# Patient Record
Sex: FEMALE | Race: WHITE | NOT HISPANIC OR LATINO | Employment: UNEMPLOYED | URBAN - METROPOLITAN AREA
[De-identification: names, ages, dates, MRNs, and addresses within clinical notes are randomized per-mention and may not be internally consistent; named-entity substitution may affect disease eponyms.]

---

## 2019-04-22 ENCOUNTER — TELEPHONE (OUTPATIENT)
Dept: OBSTETRICS AND GYNECOLOGY | Age: 29
End: 2019-04-22

## 2019-04-29 ENCOUNTER — INITIAL PRENATAL (OUTPATIENT)
Dept: OBSTETRICS AND GYNECOLOGY | Age: 29
End: 2019-04-29

## 2019-04-29 ENCOUNTER — PROCEDURE VISIT (OUTPATIENT)
Dept: OBSTETRICS AND GYNECOLOGY | Age: 29
End: 2019-04-29

## 2019-04-29 VITALS
WEIGHT: 153 LBS | SYSTOLIC BLOOD PRESSURE: 116 MMHG | BODY MASS INDEX: 25.49 KG/M2 | HEIGHT: 65 IN | DIASTOLIC BLOOD PRESSURE: 74 MMHG

## 2019-04-29 DIAGNOSIS — O36.80X0 ENCOUNTER TO DETERMINE FETAL VIABILITY OF PREGNANCY, SINGLE OR UNSPECIFIED FETUS: Primary | ICD-10-CM

## 2019-04-29 DIAGNOSIS — Z13.89 SCREENING FOR BLOOD OR PROTEIN IN URINE: ICD-10-CM

## 2019-04-29 DIAGNOSIS — Z34.01 ENCOUNTER FOR SUPERVISION OF NORMAL FIRST PREGNANCY IN FIRST TRIMESTER: Primary | ICD-10-CM

## 2019-04-29 DIAGNOSIS — Z12.4 ROUTINE CERVICAL SMEAR: ICD-10-CM

## 2019-04-29 DIAGNOSIS — Z11.3 SCREENING EXAMINATION FOR VENEREAL DISEASE: ICD-10-CM

## 2019-04-29 DIAGNOSIS — Z34.90 PREGNANCY, UNSPECIFIED GESTATIONAL AGE: ICD-10-CM

## 2019-04-29 LAB — VZV IGG SER QL: NORMAL

## 2019-04-29 PROCEDURE — 76817 TRANSVAGINAL US OBSTETRIC: CPT | Performed by: OBSTETRICS & GYNECOLOGY

## 2019-04-29 PROCEDURE — 0501F PRENATAL FLOW SHEET: CPT | Performed by: OBSTETRICS & GYNECOLOGY

## 2019-04-29 RX ORDER — DOCONEXENT, NIACINAMIDE, .ALPHA.-TOCOPHEROL ACETATE, DL-, CHOLECALCIFEROL, .BETA.-CAROTENE, ASCORBIC ACID, THIAMINE MONONITRATE, RIBOFLAVIN, PYRIDOXINE HYDROCHLORIDE, CYANOCOBALAMIN, IRON, ZINC OXIDE, CUPRIC OXIDE, POTASSIUM IODIDE, MAGNESIUM OXIDE, FOLIC ACID, AND LEVOMEFOLATE CALCIUM 200; 15; 20; 1000; 1100; 30; 1.6; 1.8; 2.5; 12; 29; 25; 2; 150; 20; .4; .6 MG/1; MG/1; [IU]/1; [IU]/1; [IU]/1; MG/1; MG/1; MG/1; MG/1; UG/1; MG/1; MG/1; MG/1; UG/1; MG/1; MG/1; MG/1
200 CAPSULE, LIQUID FILLED ORAL DAILY
Qty: 30 CAPSULE | Refills: 11 | Status: SHIPPED | OUTPATIENT
Start: 2019-04-29 | End: 2019-06-03 | Stop reason: SDUPTHER

## 2019-04-29 NOTE — PROGRESS NOTES
Patient is a 28-year-old  1 para 0 here for her first visit in our office.  She is here with her  Hans.  He is in the [employer].  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  They are excited about the pregnancy.  No complaints.  Patient does have a history of anxiety after her mother's death from a drug overdose.  She did therapy but never took any medications.  Otherwise her medical history is uncomplicated.  They do not did not desire genetic testing.    Full history was reviewed.    Exam- see exam tab    Ultrasound shows a 5-day difference in dates.  Patient does have long 35-day cycles.  Due date will be changed to .    Assessment- 6 weeks first pregnancy  New OB folder was reviewed in detail.  New OB labs today.  Patient does have a history of prior chickenpox.  Patient declines any genetic testing.  Continue prenatal vitamins.

## 2019-04-30 ENCOUNTER — TELEPHONE (OUTPATIENT)
Dept: OBSTETRICS AND GYNECOLOGY | Age: 29
End: 2019-04-30

## 2019-04-30 PROBLEM — Z28.39 RUBELLA NON-IMMUNE STATUS, ANTEPARTUM: Status: ACTIVE | Noted: 2019-04-30

## 2019-04-30 PROBLEM — O09.899 RUBELLA NON-IMMUNE STATUS, ANTEPARTUM: Status: ACTIVE | Noted: 2019-04-30

## 2019-04-30 LAB
ABO GROUP BLD: (no result)
BASOPHILS # BLD AUTO: 0 X10E3/UL (ref 0–0.2)
BASOPHILS NFR BLD AUTO: 0 %
BLD GP AB SCN SERPL QL: NEGATIVE
EOSINOPHIL # BLD AUTO: 0.1 X10E3/UL (ref 0–0.4)
EOSINOPHIL NFR BLD AUTO: 2 %
ERYTHROCYTE [DISTWIDTH] IN BLOOD BY AUTOMATED COUNT: 14.8 % (ref 12.3–15.4)
HBV SURFACE AG SERPL QL IA: NEGATIVE
HCT VFR BLD AUTO: 36.9 % (ref 34–46.6)
HCV AB S/CO SERPL IA: <0.1 S/CO RATIO (ref 0–0.9)
HGB BLD-MCNC: 12 G/DL (ref 11.1–15.9)
HIV 1+2 AB+HIV1 P24 AG SERPL QL IA: NON REACTIVE
IMM GRANULOCYTES # BLD AUTO: 0 X10E3/UL (ref 0–0.1)
IMM GRANULOCYTES NFR BLD AUTO: 0 %
LYMPHOCYTES # BLD AUTO: 2 X10E3/UL (ref 0.7–3.1)
LYMPHOCYTES NFR BLD AUTO: 28 %
MCH RBC QN AUTO: 26.8 PG (ref 26.6–33)
MCHC RBC AUTO-ENTMCNC: 32.5 G/DL (ref 31.5–35.7)
MCV RBC AUTO: 83 FL (ref 79–97)
MONOCYTES # BLD AUTO: 0.6 X10E3/UL (ref 0.1–0.9)
MONOCYTES NFR BLD AUTO: 8 %
NEUTROPHILS # BLD AUTO: 4.4 X10E3/UL (ref 1.4–7)
NEUTROPHILS NFR BLD AUTO: 62 %
PLATELET # BLD AUTO: 483 X10E3/UL (ref 150–379)
RBC # BLD AUTO: 4.47 X10E6/UL (ref 3.77–5.28)
RH BLD: POSITIVE
RPR SER QL: NON REACTIVE
RUBV IGG SERPL IA-ACNC: <0.9 INDEX
WBC # BLD AUTO: 7 X10E3/UL (ref 3.4–10.8)

## 2019-04-30 NOTE — TELEPHONE ENCOUNTER
----- Message from Josh Marcelo MD sent at 4/30/2019  9:10 AM EDT -----  Please notify blood work is normal except for rubella level that is nonimmune.  Patient will need measles mumps rubella vaccination postpartum.

## 2019-05-01 LAB
BACTERIA UR CULT: NORMAL
BACTERIA UR CULT: NORMAL
C TRACH RRNA CVX QL NAA+PROBE: NEGATIVE
CONV .: NORMAL
CYTOLOGIST CVX/VAG CYTO: NORMAL
CYTOLOGY CVX/VAG DOC THIN PREP: NORMAL
DX ICD CODE: NORMAL
HIV 1 & 2 AB SER-IMP: NORMAL
N GONORRHOEA RRNA CVX QL NAA+PROBE: NEGATIVE
OTHER STN SPEC: NORMAL
PATH REPORT.FINAL DX SPEC: NORMAL
STAT OF ADQ CVX/VAG CYTO-IMP: NORMAL

## 2019-05-02 ENCOUNTER — TELEPHONE (OUTPATIENT)
Dept: OBSTETRICS AND GYNECOLOGY | Age: 29
End: 2019-05-02

## 2019-05-02 NOTE — TELEPHONE ENCOUNTER
----- Message from Josh Marcelo MD sent at 5/2/2019  7:18 AM EDT -----  Please notify pap is normal.

## 2019-06-03 ENCOUNTER — ROUTINE PRENATAL (OUTPATIENT)
Dept: OBSTETRICS AND GYNECOLOGY | Age: 29
End: 2019-06-03

## 2019-06-03 VITALS — BODY MASS INDEX: 25.96 KG/M2 | DIASTOLIC BLOOD PRESSURE: 72 MMHG | WEIGHT: 156 LBS | SYSTOLIC BLOOD PRESSURE: 124 MMHG

## 2019-06-03 DIAGNOSIS — Z34.01 ENCOUNTER FOR SUPERVISION OF NORMAL FIRST PREGNANCY IN FIRST TRIMESTER: Primary | ICD-10-CM

## 2019-06-03 PROCEDURE — 0502F SUBSEQUENT PRENATAL CARE: CPT | Performed by: OBSTETRICS & GYNECOLOGY

## 2019-06-03 NOTE — PROGRESS NOTES
The patient is feeling well.  No bleeding.  Occasional round ligament pains.  She is here today with her .  They declined any genetic testing or AFP testing.    Doppler heart tones are positive.  Blood pressure is normal.  First trimester labs were reviewed.  Patient is rubella nonimmune.    Assessment-11 weeks  Patient is doing well.  We discussed MMR postpartum and careful handwashing during pregnancy.  Genetic testing declined.  Follow-up in 4 weeks.

## 2019-07-01 ENCOUNTER — ROUTINE PRENATAL (OUTPATIENT)
Dept: OBSTETRICS AND GYNECOLOGY | Age: 29
End: 2019-07-01

## 2019-07-01 VITALS — BODY MASS INDEX: 26.13 KG/M2 | DIASTOLIC BLOOD PRESSURE: 74 MMHG | WEIGHT: 157 LBS | SYSTOLIC BLOOD PRESSURE: 118 MMHG

## 2019-07-01 DIAGNOSIS — Z34.01 ENCOUNTER FOR SUPERVISION OF NORMAL FIRST PREGNANCY IN FIRST TRIMESTER: Primary | ICD-10-CM

## 2019-07-01 PROCEDURE — 0502F SUBSEQUENT PRENATAL CARE: CPT | Performed by: OBSTETRICS & GYNECOLOGY

## 2019-07-01 NOTE — PROGRESS NOTES
Patient complained of some episodes of frequent thirst.  That has resolved.  No vaginal bleeding or abdominal pain.  She declines any genetic testing or AFP testing.  Doppler heart tones are positive.  Blood pressure is normal.    Assessment-15 weeks  Patient is doing well.  Follow-up for anatomy ultrasound.

## 2019-07-25 ENCOUNTER — ROUTINE PRENATAL (OUTPATIENT)
Dept: OBSTETRICS AND GYNECOLOGY | Age: 29
End: 2019-07-25

## 2019-07-25 VITALS — BODY MASS INDEX: 26.63 KG/M2 | SYSTOLIC BLOOD PRESSURE: 100 MMHG | WEIGHT: 160 LBS | DIASTOLIC BLOOD PRESSURE: 68 MMHG

## 2019-07-25 DIAGNOSIS — Z28.39 RUBELLA NON-IMMUNE STATUS, ANTEPARTUM: ICD-10-CM

## 2019-07-25 DIAGNOSIS — Z3A.18 18 WEEKS GESTATION OF PREGNANCY: Primary | ICD-10-CM

## 2019-07-25 DIAGNOSIS — O09.899 RUBELLA NON-IMMUNE STATUS, ANTEPARTUM: ICD-10-CM

## 2019-07-25 PROCEDURE — 0502F SUBSEQUENT PRENATAL CARE: CPT | Performed by: PHYSICIAN ASSISTANT
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
## 2019-08-05 ENCOUNTER — PROCEDURE VISIT (OUTPATIENT)
Dept: OBSTETRICS AND GYNECOLOGY | Age: 29
End: 2019-08-05

## 2019-08-05 ENCOUNTER — ROUTINE PRENATAL (OUTPATIENT)
Dept: OBSTETRICS AND GYNECOLOGY | Age: 29
End: 2019-08-05

## 2019-08-05 VITALS — SYSTOLIC BLOOD PRESSURE: 108 MMHG | DIASTOLIC BLOOD PRESSURE: 74 MMHG | WEIGHT: 165 LBS | BODY MASS INDEX: 27.46 KG/M2

## 2019-08-05 DIAGNOSIS — Z34.90 PREGNANCY, UNSPECIFIED GESTATIONAL AGE: ICD-10-CM

## 2019-08-05 DIAGNOSIS — Z03.75 SUSPECTED CERVICAL SHORTENING NOT FOUND: ICD-10-CM

## 2019-08-05 DIAGNOSIS — O44.00 PLACENTA PREVIA ANTEPARTUM: Primary | ICD-10-CM

## 2019-08-05 DIAGNOSIS — Z34.92 SECOND TRIMESTER FETUS: Primary | ICD-10-CM

## 2019-08-05 PROCEDURE — 76817 TRANSVAGINAL US OBSTETRIC: CPT | Performed by: OBSTETRICS & GYNECOLOGY

## 2019-08-05 PROCEDURE — 0502F SUBSEQUENT PRENATAL CARE: CPT | Performed by: OBSTETRICS & GYNECOLOGY

## 2019-08-05 PROCEDURE — 76805 OB US >/= 14 WKS SNGL FETUS: CPT | Performed by: OBSTETRICS & GYNECOLOGY

## 2019-08-05 NOTE — PROGRESS NOTES
Patient is here today for anatomy ultrasound.  She is feeling well.  No vaginal bleeding or contractions.    Anatomy ultrasound shows posterior placenta with previa.  Cervical length is normal at 4.2 cm.  Anatomy appears normal except heart views are suboptimal.  Baby is breech.  Baby is a girl.  Size is equal to dates.    Weight gain for pregnancy is high.  5 pound weight gain since last visit.  Weight gain chart was reviewed with the patient.  She reports she is eating a lot of mashed potatoes but plans to cut back on carbs.    Assessment-20 weeks  Placenta previa-we discussed placenta previa.  Recheck in 4 weeks on ultrasound  Suboptimal views of the heart-recheck on ultrasound in 4 weeks  Patient declines AFP testing  Discussed weight gain

## 2019-09-03 ENCOUNTER — PROCEDURE VISIT (OUTPATIENT)
Dept: OBSTETRICS AND GYNECOLOGY | Age: 29
End: 2019-09-03

## 2019-09-03 ENCOUNTER — ROUTINE PRENATAL (OUTPATIENT)
Dept: OBSTETRICS AND GYNECOLOGY | Age: 29
End: 2019-09-03

## 2019-09-03 VITALS — WEIGHT: 170 LBS | BODY MASS INDEX: 28.29 KG/M2 | SYSTOLIC BLOOD PRESSURE: 108 MMHG | DIASTOLIC BLOOD PRESSURE: 74 MMHG

## 2019-09-03 DIAGNOSIS — IMO0002 EVALUATE ANATOMY NOT SEEN ON PRIOR SONOGRAM: ICD-10-CM

## 2019-09-03 DIAGNOSIS — O44.00 PLACENTA PREVIA ANTEPARTUM: Primary | ICD-10-CM

## 2019-09-03 DIAGNOSIS — Z34.82 ENCOUNTER FOR SUPERVISION OF OTHER NORMAL PREGNANCY IN SECOND TRIMESTER: ICD-10-CM

## 2019-09-03 PROCEDURE — 0502F SUBSEQUENT PRENATAL CARE: CPT | Performed by: OBSTETRICS & GYNECOLOGY

## 2019-09-03 NOTE — PROGRESS NOTES
Patient is here today for ultrasound to recheck the placenta previa.  She is having some allergy symptoms.  Positive fetal movement her mother-in-law is a pediatrician.  Patient is looking for a pediatrician and would like suggestions.    Ultrasound shows resolution of the placenta previa placenta is now over 4 cm from the cervical loss.  Cervical length is normal at 4.8 cm.  Views of the heart are normal with normal outflow tracts.    Weight gain of 5 pounds since last visit.  Weight gain chart was reviewed.  Weight gain is high.    Assessment-24 weeks  Placenta previa-resolved  We discussed pediatricians and classes at the hospital.  We discussed pain control in labor.  Discussed weight gain and recommend decreasing carbohydrates.  Follow-up in 4 weeks

## 2019-10-09 ENCOUNTER — ROUTINE PRENATAL (OUTPATIENT)
Dept: OBSTETRICS AND GYNECOLOGY | Age: 29
End: 2019-10-09

## 2019-10-09 VITALS — WEIGHT: 178 LBS | BODY MASS INDEX: 29.62 KG/M2 | DIASTOLIC BLOOD PRESSURE: 82 MMHG | SYSTOLIC BLOOD PRESSURE: 128 MMHG

## 2019-10-09 DIAGNOSIS — Z23 FLU VACCINE NEED: ICD-10-CM

## 2019-10-09 DIAGNOSIS — Z3A.29 29 WEEKS GESTATION OF PREGNANCY: ICD-10-CM

## 2019-10-09 DIAGNOSIS — Z13.1 SCREENING FOR DIABETES MELLITUS: Primary | ICD-10-CM

## 2019-10-09 PROCEDURE — 90472 IMMUNIZATION ADMIN EACH ADD: CPT | Performed by: OBSTETRICS & GYNECOLOGY

## 2019-10-09 PROCEDURE — 90471 IMMUNIZATION ADMIN: CPT | Performed by: OBSTETRICS & GYNECOLOGY

## 2019-10-09 PROCEDURE — 0502F SUBSEQUENT PRENATAL CARE: CPT | Performed by: OBSTETRICS & GYNECOLOGY

## 2019-10-09 PROCEDURE — 90715 TDAP VACCINE 7 YRS/> IM: CPT | Performed by: OBSTETRICS & GYNECOLOGY

## 2019-10-09 PROCEDURE — 90674 CCIIV4 VAC NO PRSV 0.5 ML IM: CPT | Performed by: OBSTETRICS & GYNECOLOGY

## 2019-10-09 NOTE — PROGRESS NOTES
The patient has no complaints.  She has been out of town and has been eating more carbohydrates.  She is scheduled for classes.    8 pound weight gain since last visit.  Total weight gain for pregnancy is high at 29 pounds  Blood pressure 128/82 with no protein.  Trace lower extremity edema  Doppler heart tones are positive and fundal height is appropriate.    Assessment-29 weeks  Third trimester labs today.  Blood type is a positive  Flu vaccination and pertussis vaccination today.  We discussed high weight gain.  We discussed foods to avoid and encourage patient to walk daily.  Check fetal weight at 34 weeks.

## 2019-10-10 ENCOUNTER — TELEPHONE (OUTPATIENT)
Dept: OBSTETRICS AND GYNECOLOGY | Age: 29
End: 2019-10-10

## 2019-10-10 DIAGNOSIS — R73.09 ELEVATED GLUCOSE TOLERANCE TEST: Primary | ICD-10-CM

## 2019-10-10 LAB
ERYTHROCYTE [DISTWIDTH] IN BLOOD BY AUTOMATED COUNT: 14.6 % (ref 12.3–15.4)
GLUCOSE 1H P 50 G GLC PO SERPL-MCNC: 145 MG/DL (ref 65–139)
HCT VFR BLD AUTO: 32.2 % (ref 34–46.6)
HGB BLD-MCNC: 10.1 G/DL (ref 11.1–15.9)
MCH RBC QN AUTO: 26.3 PG (ref 26.6–33)
MCHC RBC AUTO-ENTMCNC: 31.4 G/DL (ref 31.5–35.7)
MCV RBC AUTO: 84 FL (ref 79–97)
PLATELET # BLD AUTO: 424 X10E3/UL (ref 150–450)
RBC # BLD AUTO: 3.84 X10E6/UL (ref 3.77–5.28)
WBC # BLD AUTO: 10.9 X10E3/UL (ref 3.4–10.8)

## 2019-10-10 RX ORDER — FERROUS SULFATE 325(65) MG
325 TABLET ORAL DAILY
Qty: 30 TABLET | Refills: 5 | Status: SHIPPED | OUTPATIENT
Start: 2019-10-10 | End: 2020-01-30

## 2019-10-10 NOTE — TELEPHONE ENCOUNTER
----- Message from Josh Marcelo MD sent at 10/10/2019  8:31 AM EDT -----  Please notify that glucose tolerance test is elevated.  Please have patient come in for 3-hour GTT.  Patient is also anemic.  Send in ferrous sulfate 325 mg 1 daily.

## 2019-10-15 ENCOUNTER — TELEPHONE (OUTPATIENT)
Dept: OBSTETRICS AND GYNECOLOGY | Age: 29
End: 2019-10-15

## 2019-10-15 DIAGNOSIS — O24.410 DIET CONTROLLED GESTATIONAL DIABETES MELLITUS (GDM), ANTEPARTUM: Primary | ICD-10-CM

## 2019-10-15 LAB
GLUCOSE 1H P 100 G GLC PO SERPL-MCNC: 210 MG/DL (ref 65–179)
GLUCOSE 2H P 100 G GLC PO SERPL-MCNC: 160 MG/DL (ref 65–154)
GLUCOSE 3H P 100 G GLC PO SERPL-MCNC: 103 MG/DL (ref 65–139)
GLUCOSE P FAST SERPL-MCNC: 93 MG/DL (ref 65–94)

## 2019-10-15 NOTE — TELEPHONE ENCOUNTER
----- Message from Josh Marcelo MD sent at 10/15/2019  7:26 AM EDT -----  Please notify 3 hour is abnormal. Start qid BS check with goal of under 95 fasting and under 120 two hours post meals. Send in glucometer, lancets and test strips. 1 week appointment to review levels. I will order diabetic teaching.

## 2019-10-18 ENCOUNTER — HOSPITAL ENCOUNTER (OUTPATIENT)
Dept: DIABETES SERVICES | Facility: HOSPITAL | Age: 29
Discharge: HOME | End: 2019-10-18
Admitting: OBSTETRICS & GYNECOLOGY

## 2019-10-18 PROCEDURE — G0108 DIAB MANAGE TRN  PER INDIV: HCPCS

## 2019-10-21 ENCOUNTER — ROUTINE PRENATAL (OUTPATIENT)
Dept: OBSTETRICS AND GYNECOLOGY | Age: 29
End: 2019-10-21

## 2019-10-21 VITALS — BODY MASS INDEX: 29.12 KG/M2 | DIASTOLIC BLOOD PRESSURE: 72 MMHG | WEIGHT: 175 LBS | SYSTOLIC BLOOD PRESSURE: 114 MMHG

## 2019-10-21 DIAGNOSIS — O09.899 RUBELLA NON-IMMUNE STATUS, ANTEPARTUM: ICD-10-CM

## 2019-10-21 DIAGNOSIS — Z34.90 PREGNANCY, UNSPECIFIED GESTATIONAL AGE: ICD-10-CM

## 2019-10-21 DIAGNOSIS — Z28.39 RUBELLA NON-IMMUNE STATUS, ANTEPARTUM: ICD-10-CM

## 2019-10-21 DIAGNOSIS — O24.410 DIET CONTROLLED GESTATIONAL DIABETES MELLITUS (GDM), ANTEPARTUM: Primary | ICD-10-CM

## 2019-10-21 PROCEDURE — 0502F SUBSEQUENT PRENATAL CARE: CPT | Performed by: OBSTETRICS & GYNECOLOGY

## 2019-10-21 RX ORDER — BLOOD-GLUCOSE METER
EACH MISCELLANEOUS
Refills: 0 | COMMUNITY
Start: 2019-10-15 | End: 2020-01-30

## 2019-10-21 RX ORDER — LANCETS
EACH MISCELLANEOUS
Refills: 6 | COMMUNITY
Start: 2019-10-15 | End: 2020-01-30

## 2019-10-21 NOTE — PROGRESS NOTES
She was diagnosed with gestational diabetes since her last visit.  Her 1 hour glucose tolerance test was 145.  Her 3-hour had 2 elevated  values the 1 hour and 2-hour.  Patient has seen the dietitian and is been working hard on her diet.  She is also been doing about a 30-minute walk daily.  Her blood sugars fasting range from 80-94.  Post breakfast range from 89-1 28 with 4 elevations.  Post lunch ranges from 92-1 23 with 2 elevations in post dinners range from 10 1-1 28 with one elevation.    Doppler heart tones are positive.  Fundal height is appropriate.  Weight gain for pregnancy is high.  Blood pressure 114/72 with no protein.    Assessment-31 weeks  Gestational diabetes mellitus A1-we discussed elevations in diet and exercise in detail.  I discussed with the patient that she does have 7 elevated values which is about 25% of her values.  She would be a candidate for treatment with insulin.  Patient would like to check her sugars for 1 more week.  I encouraged her to call if they remain elevated.  Weight ultrasound in 2 weeks.

## 2019-10-28 ENCOUNTER — ROUTINE PRENATAL (OUTPATIENT)
Dept: OBSTETRICS AND GYNECOLOGY | Age: 29
End: 2019-10-28

## 2019-10-28 VITALS — SYSTOLIC BLOOD PRESSURE: 122 MMHG | DIASTOLIC BLOOD PRESSURE: 70 MMHG | BODY MASS INDEX: 28.79 KG/M2 | WEIGHT: 173 LBS

## 2019-10-28 DIAGNOSIS — O24.410 DIET CONTROLLED GESTATIONAL DIABETES MELLITUS (GDM), ANTEPARTUM: Primary | ICD-10-CM

## 2019-10-28 PROCEDURE — 0502F SUBSEQUENT PRENATAL CARE: CPT | Performed by: OBSTETRICS & GYNECOLOGY

## 2019-10-28 NOTE — PROGRESS NOTES
The patient has been following a stricter diet.  She brought her blood sugars in today.  Fasting levels are all normal.  Lunch levels and breakfast levels also are normal.  She did have 3 high levels with dinner but she did have a baby shower.    Blood pressure is normal at 122/70  Doppler heart tones are positive.  Fundal height is appropriate.    Assessment-32 weeks  Gestational diabetes mellitus A1- blood sugars are improved and we will continue diet.  Follow-up in 1 week for fetal weight ultrasound.

## 2019-11-07 ENCOUNTER — PROCEDURE VISIT (OUTPATIENT)
Dept: OBSTETRICS AND GYNECOLOGY | Age: 29
End: 2019-11-07

## 2019-11-07 ENCOUNTER — ROUTINE PRENATAL (OUTPATIENT)
Dept: OBSTETRICS AND GYNECOLOGY | Age: 29
End: 2019-11-07

## 2019-11-07 VITALS — WEIGHT: 172 LBS | BODY MASS INDEX: 28.62 KG/M2 | DIASTOLIC BLOOD PRESSURE: 76 MMHG | SYSTOLIC BLOOD PRESSURE: 112 MMHG

## 2019-11-07 DIAGNOSIS — O24.410 DIET CONTROLLED GESTATIONAL DIABETES MELLITUS (GDM), ANTEPARTUM: Primary | ICD-10-CM

## 2019-11-07 DIAGNOSIS — Z36.89 ENCOUNTER FOR ULTRASOUND TO ASSESS INTERVAL GROWTH OF FETUS: ICD-10-CM

## 2019-11-07 PROCEDURE — 76816 OB US FOLLOW-UP PER FETUS: CPT | Performed by: OBSTETRICS & GYNECOLOGY

## 2019-11-07 PROCEDURE — 0502F SUBSEQUENT PRENATAL CARE: CPT | Performed by: OBSTETRICS & GYNECOLOGY

## 2019-11-07 NOTE — PROGRESS NOTES
Patient is trying to follow her diet well.  She has 2 elevated fasting levels one elevated lunch level and 3 elevated dinner levels.  She notes good active fetal movements.  Patient is to looking for pediatrician.    1 pound weight loss since last visit.  Blood pressure is normal at 112/76 with no protein  Fundal height is appropriate and Doppler heart tones are positive.  Ultrasound for fetal weight shows an estimated fetal weight of 5 pounds 9 ounces at the 49th percentile.  Abdominal circumference is at the 69th percentile.  Baby is vertex.  ARABELLA is 11.    Assessment-33 weeks 6 days  Gestational diabetes mellitus A1- reassuring normal fetal weight.  Patient does have some elevated blood sugars but less than 25% of the values.  We discussed diet.  Continue dietary changes and exercise and follow-up weekly.  Recommend kick counts.

## 2019-11-15 ENCOUNTER — ROUTINE PRENATAL (OUTPATIENT)
Dept: OBSTETRICS AND GYNECOLOGY | Age: 29
End: 2019-11-15

## 2019-11-15 VITALS — DIASTOLIC BLOOD PRESSURE: 78 MMHG | BODY MASS INDEX: 29.12 KG/M2 | WEIGHT: 175 LBS | SYSTOLIC BLOOD PRESSURE: 124 MMHG

## 2019-11-15 DIAGNOSIS — Z36.85 ANTENATAL SCREENING FOR STREPTOCOCCUS B: Primary | ICD-10-CM

## 2019-11-15 DIAGNOSIS — O24.410 DIET CONTROLLED GESTATIONAL DIABETES MELLITUS (GDM), ANTEPARTUM: ICD-10-CM

## 2019-11-15 PROCEDURE — 0502F SUBSEQUENT PRENATAL CARE: CPT | Performed by: OBSTETRICS & GYNECOLOGY

## 2019-11-15 NOTE — PROGRESS NOTES
The patient is doing well with checking her blood sugars.  She is only had one fasting 1 breakfast and 1 lunch elevation over the 2 weeks.  She is feeling excellent fetal movement.  No vaginal bleeding or contractions.    Group B strep swab is collected.  Cervix is closed and posterior.  Blood pressure is 124/78 with no protein  Fundal height is appropriate.  Doppler heart tones are positive.  Weight at last visit was at the 49th percentile.    Assessment-35 weeks  Gestational diabetes mellitus A1- good blood sugar control.  Continue checking blood sugars 4 times a day.  Recommend kick counts.  Follow-up in 2 weeks.

## 2019-11-17 LAB — GP B STREP DNA SPEC QL NAA+PROBE: NEGATIVE

## 2019-11-18 ENCOUNTER — TELEPHONE (OUTPATIENT)
Dept: OBSTETRICS AND GYNECOLOGY | Age: 29
End: 2019-11-18

## 2019-11-18 NOTE — TELEPHONE ENCOUNTER
----- Message from Josh Marcelo MD sent at 11/18/2019  8:35 AM EST -----  Notify group B strep testing is negative.

## 2019-11-25 ENCOUNTER — ROUTINE PRENATAL (OUTPATIENT)
Dept: OBSTETRICS AND GYNECOLOGY | Age: 29
End: 2019-11-25

## 2019-11-25 VITALS — WEIGHT: 172 LBS | SYSTOLIC BLOOD PRESSURE: 112 MMHG | BODY MASS INDEX: 28.62 KG/M2 | DIASTOLIC BLOOD PRESSURE: 74 MMHG

## 2019-11-25 DIAGNOSIS — O24.410 DIET CONTROLLED GESTATIONAL DIABETES MELLITUS (GDM), ANTEPARTUM: Primary | ICD-10-CM

## 2019-11-25 PROCEDURE — 0502F SUBSEQUENT PRENATAL CARE: CPT | Performed by: OBSTETRICS & GYNECOLOGY

## 2019-11-25 NOTE — PROGRESS NOTES
The patient has no complaints.  She has not felt any contractions.  Baby is moving well.  She brought in her blood sugars.  She has 2 fasting elevations.  Breakfast values are all under 120.  She has 1 lunch and 2 dinner elevations.  Dinner was elevated when she had pizza.    Doppler heart tones are positive.  Fundal height is appropriate.  Blood pressure 112/74 with no protein  Cervical exam is declined  Group B strep swab is negative.    Assessment-36 weeks  Gestational diabetes mellitus A1- good blood sugar control.  Continue checking 4 times a day.  Recommend kick counts.  We discussed signs and symptoms of labor and when to go to the hospital.  Follow-up weekly.

## 2019-12-05 ENCOUNTER — ROUTINE PRENATAL (OUTPATIENT)
Dept: OBSTETRICS AND GYNECOLOGY | Age: 29
End: 2019-12-05

## 2019-12-05 VITALS — BODY MASS INDEX: 29.29 KG/M2 | DIASTOLIC BLOOD PRESSURE: 67 MMHG | WEIGHT: 176 LBS | SYSTOLIC BLOOD PRESSURE: 108 MMHG

## 2019-12-05 DIAGNOSIS — Z34.03 ENCOUNTER FOR SUPERVISION OF NORMAL FIRST PREGNANCY IN THIRD TRIMESTER: Primary | ICD-10-CM

## 2019-12-05 DIAGNOSIS — O24.410 DIET CONTROLLED GESTATIONAL DIABETES MELLITUS (GDM), ANTEPARTUM: ICD-10-CM

## 2019-12-05 DIAGNOSIS — Z3A.37 37 WEEKS GESTATION OF PREGNANCY: ICD-10-CM

## 2019-12-05 PROCEDURE — 0502F SUBSEQUENT PRENATAL CARE: CPT | Performed by: OBSTETRICS & GYNECOLOGY

## 2019-12-05 NOTE — PROGRESS NOTES
Patient's blood sugars are doing well.  Levels are normal except for 2 elevations.  She reports good fetal movement and no contractions.    Cervix is posterior 1 cm and thick ,firm consistency.  Blood pressure 108/67  Group B strep is negative.    Assessment- 37 weeks 6 days  Gestational diabetes mellitus A1-good blood sugar control.  Continue diet and kick counts.  Reviewed birth plan today.  Follow-up weekly.

## 2019-12-12 ENCOUNTER — ROUTINE PRENATAL (OUTPATIENT)
Dept: OBSTETRICS AND GYNECOLOGY | Age: 29
End: 2019-12-12

## 2019-12-12 VITALS — DIASTOLIC BLOOD PRESSURE: 78 MMHG | SYSTOLIC BLOOD PRESSURE: 108 MMHG | BODY MASS INDEX: 29.29 KG/M2 | WEIGHT: 176 LBS

## 2019-12-12 DIAGNOSIS — Z13.89 SCREENING FOR BLOOD OR PROTEIN IN URINE: ICD-10-CM

## 2019-12-12 DIAGNOSIS — O24.410 DIET CONTROLLED GESTATIONAL DIABETES MELLITUS (GDM), ANTEPARTUM: ICD-10-CM

## 2019-12-12 DIAGNOSIS — Z3A.38 38 WEEKS GESTATION OF PREGNANCY: ICD-10-CM

## 2019-12-12 DIAGNOSIS — Z34.03 ENCOUNTER FOR SUPERVISION OF NORMAL FIRST PREGNANCY IN THIRD TRIMESTER: Primary | ICD-10-CM

## 2019-12-12 LAB
GLUCOSE UR STRIP-MCNC: NEGATIVE MG/DL
PROT UR STRIP-MCNC: NEGATIVE MG/DL

## 2019-12-12 PROCEDURE — 81002 URINALYSIS NONAUTO W/O SCOPE: CPT | Performed by: OBSTETRICS & GYNECOLOGY

## 2019-12-12 PROCEDURE — 0502F SUBSEQUENT PRENATAL CARE: CPT | Performed by: OBSTETRICS & GYNECOLOGY

## 2019-12-12 NOTE — PROGRESS NOTES
The patient feels like the baby may have dropped.  She feels active fetal movement.  Blood sugars are normal except for one elevation with lunch and one elevation with dinner.    Cervix is posterior 1 cm and thick.  Blood pressure 108/78  Group B strep is negative  Fundal height is appropriate at 38 cm.    Assessment-38 weeks 6 days  Gestational diabetes mellitus a 1-good blood sugar control.  Patient will return next week near term and we will add a biophysical profile.  Recommend kick counts.  We did discuss possible induction of labor if no labor past 40 weeks.

## 2019-12-19 ENCOUNTER — HOSPITAL ENCOUNTER (INPATIENT)
Facility: HOSPITAL | Age: 29
LOS: 3 days | Discharge: HOME | End: 2019-12-22
Attending: OBSTETRICS & GYNECOLOGY | Admitting: OBSTETRICS & GYNECOLOGY

## 2019-12-19 ENCOUNTER — HOSPITAL ENCOUNTER (OUTPATIENT)
Dept: LABOR AND DELIVERY | Facility: HOSPITAL | Age: 29
Discharge: HOME | End: 2019-12-19

## 2019-12-19 ENCOUNTER — PROCEDURE VISIT (OUTPATIENT)
Dept: OBSTETRICS AND GYNECOLOGY | Age: 29
End: 2019-12-19

## 2019-12-19 ENCOUNTER — ROUTINE PRENATAL (OUTPATIENT)
Dept: OBSTETRICS AND GYNECOLOGY | Age: 29
End: 2019-12-19

## 2019-12-19 VITALS — DIASTOLIC BLOOD PRESSURE: 74 MMHG | WEIGHT: 176 LBS | SYSTOLIC BLOOD PRESSURE: 112 MMHG | BODY MASS INDEX: 29.29 KG/M2

## 2019-12-19 DIAGNOSIS — O24.410 GDM (GESTATIONAL DIABETES MELLITUS), CLASS A1: Primary | ICD-10-CM

## 2019-12-19 DIAGNOSIS — Z13.89 SCREENING FOR HEMATURIA OR PROTEINURIA: Primary | ICD-10-CM

## 2019-12-19 DIAGNOSIS — O24.410 DIET CONTROLLED GESTATIONAL DIABETES MELLITUS (GDM), ANTEPARTUM: ICD-10-CM

## 2019-12-19 LAB
ABO GROUP BLD: NORMAL
BLD GP AB SCN SERPL QL: NEGATIVE
DEPRECATED RDW RBC AUTO: 49.9 FL (ref 37–54)
ERYTHROCYTE [DISTWIDTH] IN BLOOD BY AUTOMATED COUNT: 15.8 % (ref 12.3–15.4)
GLUCOSE BLD-MCNC: 97 MG/DL (ref 65–99)
GLUCOSE UR STRIP-MCNC: NEGATIVE MG/DL
HCT VFR BLD AUTO: 38.9 % (ref 34–46.6)
HGB BLD-MCNC: 13.5 G/DL (ref 12–15.9)
MCH RBC QN AUTO: 30.4 PG (ref 26.6–33)
MCHC RBC AUTO-ENTMCNC: 34.7 G/DL (ref 31.5–35.7)
MCV RBC AUTO: 87.6 FL (ref 79–97)
PLATELET # BLD AUTO: 330 10*3/MM3 (ref 140–450)
PMV BLD AUTO: 10.7 FL (ref 6–12)
PROT UR STRIP-MCNC: NEGATIVE MG/DL
RBC # BLD AUTO: 4.44 10*6/MM3 (ref 3.77–5.28)
RH BLD: POSITIVE
T&S EXPIRATION DATE: NORMAL
WBC NRBC COR # BLD: 9.59 10*3/MM3 (ref 3.4–10.8)

## 2019-12-19 PROCEDURE — 81002 URINALYSIS NONAUTO W/O SCOPE: CPT | Performed by: OBSTETRICS & GYNECOLOGY

## 2019-12-19 PROCEDURE — 76816 OB US FOLLOW-UP PER FETUS: CPT | Performed by: OBSTETRICS & GYNECOLOGY

## 2019-12-19 PROCEDURE — 82947 ASSAY GLUCOSE BLOOD QUANT: CPT | Performed by: OBSTETRICS & GYNECOLOGY

## 2019-12-19 PROCEDURE — 85027 COMPLETE CBC AUTOMATED: CPT | Performed by: OBSTETRICS & GYNECOLOGY

## 2019-12-19 PROCEDURE — 86850 RBC ANTIBODY SCREEN: CPT | Performed by: OBSTETRICS & GYNECOLOGY

## 2019-12-19 PROCEDURE — 86900 BLOOD TYPING SEROLOGIC ABO: CPT | Performed by: OBSTETRICS & GYNECOLOGY

## 2019-12-19 PROCEDURE — 86901 BLOOD TYPING SEROLOGIC RH(D): CPT | Performed by: OBSTETRICS & GYNECOLOGY

## 2019-12-19 PROCEDURE — 0502F SUBSEQUENT PRENATAL CARE: CPT | Performed by: OBSTETRICS & GYNECOLOGY

## 2019-12-19 RX ORDER — ACETAMINOPHEN 325 MG/1
650 TABLET ORAL EVERY 4 HOURS PRN
Status: DISCONTINUED | OUTPATIENT
Start: 2019-12-19 | End: 2019-12-20 | Stop reason: HOSPADM

## 2019-12-19 RX ORDER — LIDOCAINE HYDROCHLORIDE 10 MG/ML
5 INJECTION, SOLUTION EPIDURAL; INFILTRATION; INTRACAUDAL; PERINEURAL AS NEEDED
Status: DISCONTINUED | OUTPATIENT
Start: 2019-12-19 | End: 2019-12-20 | Stop reason: HOSPADM

## 2019-12-19 RX ORDER — ONDANSETRON 4 MG/1
4 TABLET, FILM COATED ORAL EVERY 6 HOURS PRN
Status: DISCONTINUED | OUTPATIENT
Start: 2019-12-19 | End: 2019-12-20 | Stop reason: HOSPADM

## 2019-12-19 RX ORDER — TERBUTALINE SULFATE 1 MG/ML
0.25 INJECTION, SOLUTION SUBCUTANEOUS AS NEEDED
Status: DISCONTINUED | OUTPATIENT
Start: 2019-12-19 | End: 2019-12-20 | Stop reason: HOSPADM

## 2019-12-19 RX ORDER — MINERAL OIL
OIL (ML) MISCELLANEOUS ONCE
Status: DISCONTINUED | OUTPATIENT
Start: 2019-12-19 | End: 2019-12-20 | Stop reason: HOSPADM

## 2019-12-19 RX ORDER — SODIUM CHLORIDE, SODIUM LACTATE, POTASSIUM CHLORIDE, CALCIUM CHLORIDE 600; 310; 30; 20 MG/100ML; MG/100ML; MG/100ML; MG/100ML
125 INJECTION, SOLUTION INTRAVENOUS CONTINUOUS
Status: DISCONTINUED | OUTPATIENT
Start: 2019-12-19 | End: 2019-12-22 | Stop reason: HOSPADM

## 2019-12-19 RX ORDER — SODIUM CHLORIDE 0.9 % (FLUSH) 0.9 %
10 SYRINGE (ML) INJECTION AS NEEDED
Status: DISCONTINUED | OUTPATIENT
Start: 2019-12-19 | End: 2019-12-20 | Stop reason: HOSPADM

## 2019-12-19 RX ORDER — SODIUM CHLORIDE 0.9 % (FLUSH) 0.9 %
3 SYRINGE (ML) INJECTION EVERY 12 HOURS SCHEDULED
Status: DISCONTINUED | OUTPATIENT
Start: 2019-12-19 | End: 2019-12-20 | Stop reason: HOSPADM

## 2019-12-19 RX ORDER — ONDANSETRON 2 MG/ML
4 INJECTION INTRAMUSCULAR; INTRAVENOUS EVERY 6 HOURS PRN
Status: DISCONTINUED | OUTPATIENT
Start: 2019-12-19 | End: 2019-12-20 | Stop reason: HOSPADM

## 2019-12-19 RX ADMIN — SODIUM CHLORIDE, POTASSIUM CHLORIDE, SODIUM LACTATE AND CALCIUM CHLORIDE 125 ML/HR: 600; 310; 30; 20 INJECTION, SOLUTION INTRAVENOUS at 21:09

## 2019-12-19 RX ADMIN — DINOPROSTONE 10 MG: 10 INSERT VAGINAL at 22:00

## 2019-12-19 NOTE — PROGRESS NOTES
The patient has been feeling some contractions on and off.  She is feeling good fetal movement.  Blood sugars have been normal except for one elevation.    Ultrasound shows an estimated fetal weight of 7 pounds 3 ounces at the 34th percentile.  Abdominal circumference at the 41st percentile.  Head measurements are on the small side but the baby is low in the pelvis.  Biophysical profile is 8 out of 8 and ARABELLA is 17.  Cervix is 1 to 2 cm 50% and mid position.  Blood pressure 112/74.  No protein.  B strep is negative.    Assessment-39 weeks 6 days  Gestational diabetes mellitus A1- we discussed options of continued expectant management or induction of labor.  Patient desires induction.  We discussed cervical ripening and risk and benefits of induction.  Patient will go into night for Cervidil.

## 2019-12-20 ENCOUNTER — ANESTHESIA (OUTPATIENT)
Dept: LABOR AND DELIVERY | Facility: HOSPITAL | Age: 29
End: 2019-12-20

## 2019-12-20 ENCOUNTER — ANESTHESIA EVENT (OUTPATIENT)
Dept: LABOR AND DELIVERY | Facility: HOSPITAL | Age: 29
End: 2019-12-20

## 2019-12-20 PROCEDURE — S0260 H&P FOR SURGERY: HCPCS | Performed by: OBSTETRICS & GYNECOLOGY

## 2019-12-20 PROCEDURE — 3E033VJ INTRODUCTION OF OTHER HORMONE INTO PERIPHERAL VEIN, PERCUTANEOUS APPROACH: ICD-10-PCS | Performed by: OBSTETRICS & GYNECOLOGY

## 2019-12-20 PROCEDURE — 99024 POSTOP FOLLOW-UP VISIT: CPT | Performed by: OBSTETRICS & GYNECOLOGY

## 2019-12-20 PROCEDURE — 0KQM0ZZ REPAIR PERINEUM MUSCLE, OPEN APPROACH: ICD-10-PCS | Performed by: OBSTETRICS & GYNECOLOGY

## 2019-12-20 PROCEDURE — C1755 CATHETER, INTRASPINAL: HCPCS | Performed by: ANESTHESIOLOGY

## 2019-12-20 PROCEDURE — C1755 CATHETER, INTRASPINAL: HCPCS

## 2019-12-20 PROCEDURE — 25010000002 BUTORPHANOL PER 1 MG: Performed by: OBSTETRICS & GYNECOLOGY

## 2019-12-20 PROCEDURE — 59400 OBSTETRICAL CARE: CPT | Performed by: OBSTETRICS & GYNECOLOGY

## 2019-12-20 RX ORDER — ONDANSETRON 4 MG/1
4 TABLET, FILM COATED ORAL EVERY 6 HOURS PRN
Status: DISCONTINUED | OUTPATIENT
Start: 2019-12-20 | End: 2019-12-20 | Stop reason: HOSPADM

## 2019-12-20 RX ORDER — OXYTOCIN-SODIUM CHLORIDE 0.9% IV SOLN 30 UNIT/500ML 30-0.9/5 UT/ML-%
125 SOLUTION INTRAVENOUS CONTINUOUS PRN
Status: DISCONTINUED | OUTPATIENT
Start: 2019-12-20 | End: 2019-12-20 | Stop reason: HOSPADM

## 2019-12-20 RX ORDER — ACETAMINOPHEN 325 MG/1
650 TABLET ORAL EVERY 4 HOURS PRN
Status: DISCONTINUED | OUTPATIENT
Start: 2019-12-20 | End: 2019-12-20 | Stop reason: HOSPADM

## 2019-12-20 RX ORDER — BISACODYL 10 MG
10 SUPPOSITORY, RECTAL RECTAL DAILY PRN
Status: DISCONTINUED | OUTPATIENT
Start: 2019-12-21 | End: 2019-12-22 | Stop reason: HOSPADM

## 2019-12-20 RX ORDER — CARBOPROST TROMETHAMINE 250 UG/ML
250 INJECTION, SOLUTION INTRAMUSCULAR AS NEEDED
Status: DISCONTINUED | OUTPATIENT
Start: 2019-12-20 | End: 2019-12-20 | Stop reason: HOSPADM

## 2019-12-20 RX ORDER — IBUPROFEN 600 MG/1
600 TABLET ORAL EVERY 6 HOURS PRN
Status: DISCONTINUED | OUTPATIENT
Start: 2019-12-20 | End: 2019-12-22 | Stop reason: HOSPADM

## 2019-12-20 RX ORDER — LANOLIN
CREAM (ML) TOPICAL AS NEEDED
Status: DISCONTINUED | OUTPATIENT
Start: 2019-12-20 | End: 2019-12-22 | Stop reason: HOSPADM

## 2019-12-20 RX ORDER — EPHEDRINE SULFATE 50 MG/ML
5 INJECTION, SOLUTION INTRAVENOUS AS NEEDED
Status: DISCONTINUED | OUTPATIENT
Start: 2019-12-20 | End: 2019-12-20 | Stop reason: HOSPADM

## 2019-12-20 RX ORDER — ONDANSETRON 2 MG/ML
4 INJECTION INTRAMUSCULAR; INTRAVENOUS ONCE AS NEEDED
Status: DISCONTINUED | OUTPATIENT
Start: 2019-12-20 | End: 2019-12-20 | Stop reason: HOSPADM

## 2019-12-20 RX ORDER — BUTORPHANOL TARTRATE 1 MG/ML
1 INJECTION, SOLUTION INTRAMUSCULAR; INTRAVENOUS
Status: DISCONTINUED | OUTPATIENT
Start: 2019-12-20 | End: 2019-12-22 | Stop reason: HOSPADM

## 2019-12-20 RX ORDER — ECHINACEA PURPUREA EXTRACT 125 MG
1 TABLET ORAL AS NEEDED
Status: DISCONTINUED | OUTPATIENT
Start: 2019-12-20 | End: 2019-12-22 | Stop reason: HOSPADM

## 2019-12-20 RX ORDER — DOCUSATE SODIUM 100 MG/1
100 CAPSULE, LIQUID FILLED ORAL 2 TIMES DAILY PRN
Status: DISCONTINUED | OUTPATIENT
Start: 2019-12-20 | End: 2019-12-22 | Stop reason: HOSPADM

## 2019-12-20 RX ORDER — DIPHENHYDRAMINE HYDROCHLORIDE 50 MG/ML
12.5 INJECTION INTRAMUSCULAR; INTRAVENOUS EVERY 8 HOURS PRN
Status: DISCONTINUED | OUTPATIENT
Start: 2019-12-20 | End: 2019-12-20 | Stop reason: HOSPADM

## 2019-12-20 RX ORDER — ONDANSETRON 2 MG/ML
4 INJECTION INTRAMUSCULAR; INTRAVENOUS EVERY 6 HOURS PRN
Status: DISCONTINUED | OUTPATIENT
Start: 2019-12-20 | End: 2019-12-20 | Stop reason: HOSPADM

## 2019-12-20 RX ORDER — ONDANSETRON 4 MG/1
4 TABLET, FILM COATED ORAL EVERY 8 HOURS PRN
Status: DISCONTINUED | OUTPATIENT
Start: 2019-12-20 | End: 2019-12-22 | Stop reason: HOSPADM

## 2019-12-20 RX ORDER — OXYCODONE HYDROCHLORIDE AND ACETAMINOPHEN 5; 325 MG/1; MG/1
1 TABLET ORAL EVERY 4 HOURS PRN
Status: DISCONTINUED | OUTPATIENT
Start: 2019-12-20 | End: 2019-12-22 | Stop reason: HOSPADM

## 2019-12-20 RX ORDER — METHYLERGONOVINE MALEATE 0.2 MG/ML
200 INJECTION INTRAVENOUS ONCE AS NEEDED
Status: DISCONTINUED | OUTPATIENT
Start: 2019-12-20 | End: 2019-12-20 | Stop reason: HOSPADM

## 2019-12-20 RX ORDER — OXYTOCIN-SODIUM CHLORIDE 0.9% IV SOLN 30 UNIT/500ML 30-0.9/5 UT/ML-%
999 SOLUTION INTRAVENOUS ONCE
Status: DISCONTINUED | OUTPATIENT
Start: 2019-12-20 | End: 2019-12-20 | Stop reason: HOSPADM

## 2019-12-20 RX ORDER — OXYCODONE HYDROCHLORIDE AND ACETAMINOPHEN 5; 325 MG/1; MG/1
2 TABLET ORAL EVERY 4 HOURS PRN
Status: DISCONTINUED | OUTPATIENT
Start: 2019-12-20 | End: 2019-12-22 | Stop reason: HOSPADM

## 2019-12-20 RX ORDER — FAMOTIDINE 10 MG/ML
20 INJECTION, SOLUTION INTRAVENOUS ONCE AS NEEDED
Status: DISCONTINUED | OUTPATIENT
Start: 2019-12-20 | End: 2019-12-20 | Stop reason: HOSPADM

## 2019-12-20 RX ORDER — PROMETHAZINE HYDROCHLORIDE 12.5 MG/1
12.5 TABLET ORAL EVERY 4 HOURS PRN
Status: DISCONTINUED | OUTPATIENT
Start: 2019-12-20 | End: 2019-12-22 | Stop reason: HOSPADM

## 2019-12-20 RX ORDER — OXYCODONE HYDROCHLORIDE AND ACETAMINOPHEN 5; 325 MG/1; MG/1
2 TABLET ORAL EVERY 4 HOURS PRN
Status: DISCONTINUED | OUTPATIENT
Start: 2019-12-20 | End: 2019-12-20 | Stop reason: HOSPADM

## 2019-12-20 RX ORDER — OXYTOCIN-SODIUM CHLORIDE 0.9% IV SOLN 30 UNIT/500ML 30-0.9/5 UT/ML-%
2-20 SOLUTION INTRAVENOUS
Status: DISCONTINUED | OUTPATIENT
Start: 2019-12-20 | End: 2019-12-22 | Stop reason: HOSPADM

## 2019-12-20 RX ORDER — OXYTOCIN-SODIUM CHLORIDE 0.9% IV SOLN 30 UNIT/500ML 30-0.9/5 UT/ML-%
250 SOLUTION INTRAVENOUS CONTINUOUS
Status: ACTIVE | OUTPATIENT
Start: 2019-12-20 | End: 2019-12-20

## 2019-12-20 RX ORDER — MISOPROSTOL 200 UG/1
800 TABLET ORAL AS NEEDED
Status: DISCONTINUED | OUTPATIENT
Start: 2019-12-20 | End: 2019-12-20 | Stop reason: HOSPADM

## 2019-12-20 RX ADMIN — OXYCODONE AND ACETAMINOPHEN 1 TABLET: 5; 325 TABLET ORAL at 20:57

## 2019-12-20 RX ADMIN — SODIUM CHLORIDE, POTASSIUM CHLORIDE, SODIUM LACTATE AND CALCIUM CHLORIDE 1000 ML: 600; 310; 30; 20 INJECTION, SOLUTION INTRAVENOUS at 09:15

## 2019-12-20 RX ADMIN — Medication 10 ML/HR: at 09:34

## 2019-12-20 RX ADMIN — BUTORPHANOL TARTRATE 1 MG: 1 INJECTION, SOLUTION INTRAMUSCULAR; INTRAVENOUS at 03:21

## 2019-12-20 RX ADMIN — Medication 1 APPLICATION: at 23:51

## 2019-12-20 RX ADMIN — IBUPROFEN 600 MG: 600 TABLET, FILM COATED ORAL at 20:57

## 2019-12-20 RX ADMIN — EPHEDRINE SULFATE 5 MG: 50 INJECTION INTRAVENOUS at 12:07

## 2019-12-20 RX ADMIN — SODIUM CHLORIDE, POTASSIUM CHLORIDE, SODIUM LACTATE AND CALCIUM CHLORIDE 999 ML/HR: 600; 310; 30; 20 INJECTION, SOLUTION INTRAVENOUS at 12:07

## 2019-12-20 RX ADMIN — OXYTOCIN 1 MILLI-UNITS/MIN: 10 INJECTION, SOLUTION INTRAMUSCULAR; INTRAVENOUS at 06:34

## 2019-12-20 RX ADMIN — SODIUM CHLORIDE, POTASSIUM CHLORIDE, SODIUM LACTATE AND CALCIUM CHLORIDE 125 ML/HR: 600; 310; 30; 20 INJECTION, SOLUTION INTRAVENOUS at 02:09

## 2019-12-20 RX ADMIN — DOCUSATE SODIUM 100 MG: 100 CAPSULE, LIQUID FILLED ORAL at 20:57

## 2019-12-20 RX ADMIN — Medication 1 APPLICATION: at 20:57

## 2019-12-20 NOTE — ANESTHESIA PROCEDURE NOTES
Labor Epidural      Patient reassessed immediately prior to procedure    Patient location during procedure: OB  Indication:at surgeon's request  Performed By  Anesthesiologist: Leora Merchant MD  Preanesthetic Checklist  Completed: patient identified, site marked, surgical consent, pre-op evaluation, timeout performed, IV checked, risks and benefits discussed and monitors and equipment checked  Prep:  Pt Position:sitting  Sterile Tech:cap, gloves, mask and sterile barrier  Prep:chlorhexidine gluconate and isopropyl alcohol  Monitoring:blood pressure monitoring, continuous pulse oximetry and EKG  Epidural Block Procedure:  Approach:midline  Guidance:landmark technique  Location:L4-L5  Needle Type:Tuohy  Needle Gauge:17  Loss of Resistance Medium: saline  Loss of Resistance: 6cm  Cath Depth at skin:10 cm  Paresthesia: none  Aspiration:negative  Test Dose:negative  Number of Attempts: 1  Post Assessment:  Dressing:occlusive dressing applied and secured with tape  Pt Tolerance:patient tolerated the procedure well with no apparent complications  Complications:no

## 2019-12-20 NOTE — NURSING NOTE
Dr. Hurt called back, Md notified about patients bleeding at this time. Patient had been previously assessed by dr. Montoya this am. Patient had some clots this morning and just now when patient went to the bathroom she had some more bleeding, this time she had a 4cm clot that seemed more residual bleeding that may have been vaginal pooling form earlier. Dr. Montoya had previously not wanted anyone to check her cervix. Patients abdomen palpates soft at this time. Patient is complaining of pain but contraction pain. Asked MD to please come assess her bleeding, md states that she is on her way in and will assess her bleeding when she gets here and talk with her patient. Will continue to monitor.

## 2019-12-20 NOTE — L&D DELIVERY NOTE
[facility]  Vaginal Delivery Note    Delivery     Diagnosis:       Intrauterine Pregnancy 40 0/7 weeks      Diet controlled gestational diabetes  Delivery: Vaginal, Spontaneous     YOB: 2017    Time of Birth: 5:11 PM      Anesthesia: Epidural     Delivering clinician: Lili Hurt    Forceps?   No   Vacuum? No    Shoulder dystocia present: No        Delivery narrative:  The patient was admitted for induction of labor at term with gestational diabetes well controlled by diet.  She underwent cervadil ripening.  cervadil came out a little early and she was started on pitocin.. Her GBS status was negative. The FHTs were reassuring throughout the labor and delivery course. Membranes ruptured spontaneously and was clear fluid.  She received epidural for anesthesia.  She progressed along a normal labor curve to completely dilated.  The patient pushed for 4 contractions for a spontaneous vaginal delivery. The infant was bulb suctioned on the perineum and again after delivery.  The infant had good tone and cry.  The infant was placed on mothers abdomen. Gases were not sent. Cord blood was obtained. The placenta did follow spontaneously. The uterus was explored. A second degree laceration was repaired in the usual fashion.  The patient tolerated the procedure well.  Mother and infant are recovering well at this time    Infant    Findings:  female infant    Infant observations: Weight:   Length:   Observations/Comments:  scale 2      Apgars: 9   @ 1 minute /    9   @ 5 minutes   Infant Name: Lidia Phelps     Placenta, Cord, and Fluid    Placenta delivered  Spontaneous  at  12/20/2019  5:13 PM     Cord: 3 vessels  present.   Nuchal Cord?  no   Cord blood obtained: Yes    Cord gases obtained:  No    Cord gas results: N/A         Repair    Episiotomy: No   Lacerations: Yes  Laceration Information  Laceration Repaired?   Perineal:   2   yes   Periurethral:         Labial:         Sulcus:         Vaginal:          Cervical:           Suture used for repair: 3-0 Vicryl   Estimated Blood Loss:   400 mls.         Complications  none    Disposition  Mother to Mother Baby/Postpartum  in stable condition currently.  Baby to NBN  in stable condition currently.      Lili Hurt MD  12/20/19  5:36 PM

## 2019-12-20 NOTE — PLAN OF CARE
Problem: Patient Care Overview  Goal: Plan of Care Review  Outcome: Ongoing (interventions implemented as appropriate)  Flowsheets (Taken 12/20/2019 0549)  Progress: improving  Plan of Care Reviewed With: patient; spouse  Outcome Summary: pt had cervidil placed last night at 2200, cervidil came out on its own at 0432 when pt got up to the bathroom. Pt had some bleeding noted when up to the bathroom and Dr. Montoya was notified, he came to bedside to assess pt and bleeding. Pt is currently resting comfortably. Plans to start Pitocin.  Goal: Individualization and Mutuality  Outcome: Ongoing (interventions implemented as appropriate)  Flowsheets  Taken 12/20/2019 0549  Patient Specific Goals (Include Timeframe): VICKI after delivery  Patient Specific Interventions: FOB to cut cord  Patient Specific Preferences: vaginal delivery, VICKI, undecided on epidural but leaning towards it, breastfeeding  Taken 12/19/2019 2131  How to Address Anxieties/Fears: educate about POC  How Would You and/or Your Support Person Like to Participate in Your Care?: paricipate in POC  What Anxieties, Fears, Concerns, or Questions Do You Have About Your Care?: labor and delivery in general   Goal: Discharge Needs Assessment  Outcome: Ongoing (interventions implemented as appropriate)  Flowsheets  Taken 12/20/2019 0549  Equipment Needed After Discharge: none  Anticipated Changes Related to Illness: none  Transportation Concerns: car, none  Concerns to be Addressed: no discharge needs identified  Readmission Within the Last 30 Days: no previous admission in last 30 days  Taken 12/19/2019 2130  Equipment Currently Used at Home: none  Taken 12/19/2019 2128  Transportation Anticipated: car, drives self;family or friend will provide  Patient/Family Anticipated Services at Transition: none  Patient/Family Anticipates Transition to: home;home with family  Goal: Interprofessional Rounds/Family Conf  Outcome: Ongoing (interventions implemented as  appropriate)  Flowsheets (Taken 12/20/2019 0549)  Participants: patient; nursing; physician     Problem: Labor (Cervical Ripen, Induct, Augment) (Adult,Obstetrics,Pediatric)  Goal: Signs and Symptoms of Listed Potential Problems Will be Absent, Minimized or Managed (Labor)  Outcome: Ongoing (interventions implemented as appropriate)  Flowsheets (Taken 12/20/2019 1521)  Problems Assessed (Labor): all  Problems Present (Labor): none

## 2019-12-20 NOTE — PROGRESS NOTES
"[facility]  Obstetric Progress Note    Chief Complaint   Patient presents with   • Scheduled Induction     +FM, denies leakage of fluids and vaginal bleeding       Subjective     Patient:    The patient feels well. Just got epidural.  Bleeding per nursing is minimal.  SROM clear.        Review of Systems - Negative except sleepy       Objective     Vital Signs Range for the last 24 hours  Temp:  [97.7 °F (36.5 °C)-98.2 °F (36.8 °C)] 98.1 °F (36.7 °C)   Temp src: Oral   BP: ()/(55-89) 104/60   Heart Rate:  [58-90] 88   Resp:  [16-18] 18               Weight:  [81.4 kg (179 lb 6.4 oz)] 81.4 kg (179 lb 6.4 oz)       Flowsheet Rows      First Filed Value   Admission Height  165.1 cm (65\") Documented at 12/19/2019 2124   Admission Weight  81.4 kg (179 lb 6.4 oz) Documented at 12/19/2019 2124          Intake/Output last 24 hours:      Intake/Output Summary (Last 24 hours) at 12/20/2019 1057  Last data filed at 12/20/2019 0636  Gross per 24 hour   Intake 1306 ml   Output 1300 ml   Net 6 ml       Intake/Output this shift:    No intake/output data recorded.    Physical Exam:  General: Patient is comfortable, well appearing and in no acute distress   Heart CVS exam: normal rate and regular rhythm.   Lungs Chest: no tachypnea, retractions or cyanosis.     Abdomen Abdominal exam: palpable contractions, EFW 7 1/2 lbs.   Extremities Exam of extremities: peripheral pulses normal, no pedal edema, no clubbing or cyanosis     Presentation: vtx   Cervix: Exam by: Method: sterile exam per physician   Dilation: Cervical Dilation (cm): 4   Effacement: Cervical Effacement: 100%   Station: Fetal Station: -2         Fetal Heart Rate Assessment   Method: Fetal HR Assessment Method: external   Beats/min: Fetal HR (beats/min): 130   Baseline: Fetal Heart Baseline Rate: normal range   Varibility: Fetal HR Variability: moderate (amplitude range 6 to 25 bpm)   Accels: Fetal HR Accelerations: greater than/equal to 15 bpm, lasting at least " 15 seconds   Decels: Fetal HR Decelerations: absent   Tracing Category:       Uterine Assessment   Method: Method: external tocotransducer   Frequency (min): Contraction Frequency (Minutes): 2-4   Ctx Count in 10 min:     Duration:     Intensity: Contraction Intensity: moderate by palpation   Intensity by IUPC:     Resting Tone: Uterine Resting Tone: soft by palpation   Resting Tone by IUPC:     Soper Units:       Lab Results (last 24 hours)     Procedure Component Value Units Date/Time    Glucose, Random [[id number]]  (Normal) Collected:  19    Specimen:  Blood from Arm, Left Updated:  19     Glucose 97 mg/dL     CBC (No Diff) [[id number]]  (Abnormal) Collected:  19    Specimen:  Blood from Arm, Left Updated:  19     WBC 9.59 10*3/mm3      RBC 4.44 10*6/mm3      Hemoglobin 13.5 g/dL      Hematocrit 38.9 %      MCV 87.6 fL      MCH 30.4 pg      MCHC 34.7 g/dL      RDW 15.8 %      RDW-SD 49.9 fl      MPV 10.7 fL      Platelets 330 10*3/mm3               Assessment/Plan       Pregnancy    Rubella non-immune status, antepartum    Diet controlled gestational diabetes mellitus (GDM), antepartum        Assessment:  1.  Intrauterine pregnancy at 40w0d weeks gestation with reactive fetal status.    2.  labor  with ROM  3.  Obstetrical history significant for some bleeding early labor, now appears resolved.  Gestational diabetes well controlled on diet.   4.  GBS status: neg    Plan:  1. labor augmentation  Pitocin  2. Plan of care has been reviewed with patient and family  3.  Risks, benefits of treatment plan have been discussed.  4.  All questions have been answered.  5.  Anticipate       Lili Hurt MD 2019 10:57 AM

## 2019-12-20 NOTE — NURSING NOTE
Dr. Hurt called about patient, md notified that patient is complete and zero station, and fetal head is in OP position. MD states ok to labor down at this time and she is main OR if we need her and she will call when she is out of the main OR. Will continue to monitor.

## 2019-12-20 NOTE — NURSING NOTE
Dr. Hurt in the room assessing patient, md told patient felt gush of amniotic fluid, clear fluid noted with scant bleeding with it. MD checked patient to see cervical change. Md notes minimal bleeding on cervical exam. No new orders, will continue to monitor.

## 2019-12-20 NOTE — H&P
History and physical    Admission date 2019     Patient: Deepti Davies MRN: [id number]   YOB: 1990 Age: 29 y.o. Sex: female     Chief Complaint   Patient presents with   • Scheduled Induction     +FM, denies leakage of fluids and vaginal bleeding       HPI:    Deepti Davies is a 29 y.o.,  AT 40w0d admitted for cervical ripening followed by Pitocin induction.  Patient was seen in the office yesterday at which time an ultrasound was performed and she was found to be in the 34th percentile.  BPP was 8 out of 8.  Cervix was 1 to 2 cm 50% mid position.  Patient is GBS negative.  Pregnancy has been complicated by placenta previa early in the pregnancy, upon reviewing old notes it appears that it was documented to have resolved around 26 weeks gestation  She also has gestational diabetes, A1, managed quite well with diet.  Admission blood sugar was 97. Admits to good fetal movement.   Patient had Cervidil placed and a short time ago came out on its own and she was found to have a small amount of vaginal bleeding at that time.  Upon my arrival there is no more bleeding, and fetal heart tones appear reactive.  Patient had an ultrasound yesterday as outlined above with a BPP of 8 out of 8.  She is having some very mild contractions at this time and no further spotting or bleeding with no evidence of leakage of fluid    Patient Active Problem List   Diagnosis   • Pregnancy   • Rubella non-immune status, antepartum   • Diet controlled gestational diabetes mellitus (GDM), antepartum     OB History    Para Term  AB Living   1             SAB TAB Ectopic Molar Multiple Live Births                    # Outcome Date GA Lbr Rik/2nd Weight Sex Delivery Anes PTL Lv   1 Current              Past Medical History:   Diagnosis Date   • Anxiety     after mother's death - overdose      Past Surgical History:   Procedure Laterality Date   • WISDOM TOOTH EXTRACTION       No current facility-administered  medications on file prior to encounter.      Current Outpatient Medications on File Prior to Encounter   Medication Sig Dispense Refill   • Blood Glucose Monitoring Suppl (ONE TOUCH ULTRA 2) w/Device kit TEST BS QID  0   • ferrous sulfate 325 (65 FE) MG tablet Take 1 tablet by mouth Daily. 30 tablet 5   • Lancets (ONETOUCH ULTRASOFT) lancets TEST BLOOD SUGAR QID  6   • ONE TOUCH ULTRA TEST test strip TEST BLOOD SUGAR QID  6   • Prenatal Vit w/Wr-Qqpuhaskm-VW (PNV PO) Take  by mouth.         ROS:      Except as outlined in history of physical illness, patient denies any changes in her GYN, , GI systems. All other systems reviewed are negative.      OBJECTIVE:     Vitals:   Vitals:    12/20/19 0200 12/20/19 0300 12/20/19 0400 12/20/19 0500   BP: 113/73 120/88 145/82 144/88   Pulse: 74 90 74 80   Resp:       Temp:       TempSrc:       Weight:       Height:             Appearance/Psychiatric: In no distress   Constitutional: The patient is well nourished   Cardiovascular: She does not have edema. Heart RRR  Respiratory: Respiratory effort is normal. CTAB   Abdomen: Soft, gravid.  Ext: nontender, no edema. +2/4 bilateral patellar reflexes   Cx; as outlined above       LOS: 1 day    Ramos Montoya MD   December 20, 2019    Assessment and Plan:   Pregnancy [Z34.90]    40 weeks 0-day intrauterine pregnancy    Gestational diabetes, A1, managed quite well on diet with an admitting blood sugar of 97    Resolved placenta previa at 26 weeks with subsequent normal BPP's  Spontaneous expulsion of Cervidil with some light vaginal spotting which has stopped.  Will not replace Cervidil given the time of the morning, will begin Pitocin portion of induction.  If bleeding were to recur could consider an ultrasound to once again look at placentation and any evidence for vasa previa.  Although, this seems unlikely.  Discussed management with patient and  and they are in agreement.    Patient is planning eventually on using  epidural for her anesthesia.    GBS negative

## 2019-12-20 NOTE — ANESTHESIA PREPROCEDURE EVALUATION
Anesthesia Evaluation     Patient summary reviewed and Nursing notes reviewed   no history of anesthetic complications:  NPO Solid Status: > 8 hours  NPO Liquid Status: > 2 hours           Airway   Mallampati: II  TM distance: >3 FB  Neck ROM: full  Dental - normal exam     Pulmonary - negative pulmonary ROS and normal exam   Cardiovascular - negative cardio ROS and normal exam  Exercise tolerance: good (4-7 METS)    Rhythm: regular        Neuro/Psych  (+) psychiatric history Anxiety,     GI/Hepatic/Renal/Endo    (+)   diabetes mellitus,     Musculoskeletal (-) negative ROS    Abdominal  - normal exam    Bowel sounds: normal.   Substance History - negative use     OB/GYN    (+) Pregnant,         Other                        Anesthesia Plan    ASA 2     epidural       Anesthetic plan, all risks, benefits, and alternatives have been provided, discussed and informed consent has been obtained with: patient.    Plan discussed with CRNA and attending.

## 2019-12-21 LAB
BASOPHILS # BLD AUTO: 0.05 10*3/MM3 (ref 0–0.2)
BASOPHILS NFR BLD AUTO: 0.5 % (ref 0–1.5)
DEPRECATED RDW RBC AUTO: 48.8 FL (ref 37–54)
EOSINOPHIL # BLD AUTO: 0.05 10*3/MM3 (ref 0–0.4)
EOSINOPHIL NFR BLD AUTO: 0.5 % (ref 0.3–6.2)
ERYTHROCYTE [DISTWIDTH] IN BLOOD BY AUTOMATED COUNT: 15.3 % (ref 12.3–15.4)
HCT VFR BLD AUTO: 36.3 % (ref 34–46.6)
HGB BLD-MCNC: 12.3 G/DL (ref 12–15.9)
IMM GRANULOCYTES # BLD AUTO: 0.04 10*3/MM3 (ref 0–0.05)
IMM GRANULOCYTES NFR BLD AUTO: 0.4 % (ref 0–0.5)
LYMPHOCYTES # BLD AUTO: 2.18 10*3/MM3 (ref 0.7–3.1)
LYMPHOCYTES NFR BLD AUTO: 21 % (ref 19.6–45.3)
MCH RBC QN AUTO: 29.6 PG (ref 26.6–33)
MCHC RBC AUTO-ENTMCNC: 33.9 G/DL (ref 31.5–35.7)
MCV RBC AUTO: 87.5 FL (ref 79–97)
MONOCYTES # BLD AUTO: 0.81 10*3/MM3 (ref 0.1–0.9)
MONOCYTES NFR BLD AUTO: 7.8 % (ref 5–12)
NEUTROPHILS # BLD AUTO: 7.23 10*3/MM3 (ref 1.7–7)
NEUTROPHILS NFR BLD AUTO: 69.8 % (ref 42.7–76)
NRBC BLD AUTO-RTO: 0 /100 WBC (ref 0–0.2)
PLATELET # BLD AUTO: 282 10*3/MM3 (ref 140–450)
PMV BLD AUTO: 11.1 FL (ref 6–12)
RBC # BLD AUTO: 4.15 10*6/MM3 (ref 3.77–5.28)
WBC NRBC COR # BLD: 10.36 10*3/MM3 (ref 3.4–10.8)

## 2019-12-21 PROCEDURE — 85025 COMPLETE CBC W/AUTO DIFF WBC: CPT | Performed by: OBSTETRICS & GYNECOLOGY

## 2019-12-21 RX ADMIN — OXYCODONE AND ACETAMINOPHEN 1 TABLET: 5; 325 TABLET ORAL at 06:33

## 2019-12-21 RX ADMIN — IBUPROFEN 600 MG: 600 TABLET, FILM COATED ORAL at 11:09

## 2019-12-21 RX ADMIN — IBUPROFEN 600 MG: 600 TABLET, FILM COATED ORAL at 04:45

## 2019-12-21 RX ADMIN — OXYCODONE AND ACETAMINOPHEN 1 TABLET: 5; 325 TABLET ORAL at 16:14

## 2019-12-21 RX ADMIN — DOCUSATE SODIUM 100 MG: 100 CAPSULE, LIQUID FILLED ORAL at 21:13

## 2019-12-21 RX ADMIN — SALINE NASAL SPRAY 1 SPRAY: 1.5 SOLUTION NASAL at 01:13

## 2019-12-21 RX ADMIN — DOCUSATE SODIUM 100 MG: 100 CAPSULE, LIQUID FILLED ORAL at 09:06

## 2019-12-21 RX ADMIN — IBUPROFEN 600 MG: 600 TABLET, FILM COATED ORAL at 17:01

## 2019-12-21 NOTE — PROGRESS NOTES
2019    Name:Deepti Davies   MR#:[id number]    Vaginal Delivery Progress Note    HD#2    Subjective   Postpartum Day 1: 29 y.o. yo Female  delivered at 40w0d  delivered a female  infant.     The patient feels well.  Her pain is controlled.    She is ambulating well.  Patient describes her bleeding as thin lochia.    Breastfeeding: without difficulty.     Patient Active Problem List   Diagnosis   • Pregnancy   • Rubella non-immune status, antepartum   • Diet controlled gestational diabetes mellitus (GDM), antepartum       Objective   Vital Signs Range for the last 24 hours  Temp: Temp:  [97.7 °F (36.5 °C)-98.8 °F (37.1 °C)] 97.8 °F (36.6 °C) Temp src: Oral   BP: BP: ()/(46-98) 108/70        Pulse: Heart Rate:  [58-92] 72  RR: Resp:  [16-18] 16  Weight: 81.4 kg (179 lb 6.4 oz)  BMI:  Body mass index is 29.85 kg/m².      Lab Results   Component Value Date    WBC 10.36 2019    HGB 12.3 2019    HCT 36.3 2019    MCV 87.5 2019     2019       Physical Exam  General:  no acute distresss.  Abdomen: abdomen is soft without significant tenderness, masses, organomegaly or guarding. Fundus: Firm with scant lochia  Extremities: no cyanosis, and 1+ edema, no CT    Perineum:  Intact    Assessment/Plan   1.  PPD# 1      Plan:  Routine Postpartum care      Escobar Field MD  2019 9:27 AM

## 2019-12-21 NOTE — LACTATION NOTE
Mom reports baby is nursing well and she has had help with latching. She denies questions ate this time. Mom has personal pump. Encouraged to call if needing assistance.   Lactation Consult Note    Evaluation Completed: 2019 10:45 AM  Patient Name: Deepti Davies  :  1990  MRN:  [id number]     REFERRAL  INFORMATION:                                         DELIVERY HISTORY:          Skin to skin initiation date/time: 2019  5:15 PM   Skin to skin end date/time:              MATERNAL ASSESSMENT:                               INFANT ASSESSMENT:  Information for the patient's :  Pascual Davies [[id number]]   No past medical history on file.                                                                                                                                MATERNAL INFANT FEEDING:                                                                       EQUIPMENT TYPE:                                 BREAST PUMPING:          LACTATION REFERRALS:

## 2019-12-22 VITALS
BODY MASS INDEX: 29.89 KG/M2 | SYSTOLIC BLOOD PRESSURE: 115 MMHG | DIASTOLIC BLOOD PRESSURE: 72 MMHG | HEIGHT: 65 IN | TEMPERATURE: 97.8 F | HEART RATE: 73 BPM | RESPIRATION RATE: 16 BRPM | WEIGHT: 179.4 LBS

## 2019-12-22 PROCEDURE — 90471 IMMUNIZATION ADMIN: CPT | Performed by: OBSTETRICS & GYNECOLOGY

## 2019-12-22 PROCEDURE — 90707 MMR VACCINE SC: CPT | Performed by: OBSTETRICS & GYNECOLOGY

## 2019-12-22 PROCEDURE — 25010000002 MEASLES, MUMPS & RUBELLA VAC RECONSTITUTED SOLUTION: Performed by: OBSTETRICS & GYNECOLOGY

## 2019-12-22 RX ORDER — IBUPROFEN 600 MG/1
600 TABLET ORAL EVERY 6 HOURS PRN
Qty: 60 TABLET | Refills: 1 | Status: SHIPPED | OUTPATIENT
Start: 2019-12-22 | End: 2020-01-30

## 2019-12-22 RX ORDER — OXYCODONE HYDROCHLORIDE AND ACETAMINOPHEN 5; 325 MG/1; MG/1
1 TABLET ORAL EVERY 6 HOURS PRN
Qty: 10 TABLET | Refills: 0 | Status: SHIPPED | OUTPATIENT
Start: 2019-12-22 | End: 2019-12-30

## 2019-12-22 RX ADMIN — OXYCODONE AND ACETAMINOPHEN 1 TABLET: 5; 325 TABLET ORAL at 08:36

## 2019-12-22 RX ADMIN — OXYCODONE AND ACETAMINOPHEN 1 TABLET: 5; 325 TABLET ORAL at 03:19

## 2019-12-22 RX ADMIN — DOCUSATE SODIUM 100 MG: 100 CAPSULE, LIQUID FILLED ORAL at 08:36

## 2019-12-22 RX ADMIN — MEASLES, MUMPS, AND RUBELLA VIRUS VACCINE LIVE 0.5 ML: 1000; 12500; 1000 INJECTION, POWDER, LYOPHILIZED, FOR SUSPENSION SUBCUTANEOUS at 08:37

## 2019-12-22 RX ADMIN — IBUPROFEN 600 MG: 600 TABLET, FILM COATED ORAL at 02:07

## 2019-12-22 NOTE — PLAN OF CARE
Problem: Patient Care Overview  Goal: Plan of Care Review  Flowsheets  Taken 12/20/2019 1815 by Rose Swan, RN  Progress: improving  Taken 12/21/2019 2100 by Rosa Elena Girard, RN  Plan of Care Reviewed With: patient;spouse  Taken 12/22/2019 0612 by Rosa Elena Girard, RN  Outcome Summary: Vs and bleeding stable, nipples tender, but breastfeeding improving, bonding, amb in espino, perineal area with soft bruising. medicated for back pain and perineal discomfort

## 2019-12-22 NOTE — LACTATION NOTE
Mom wanting assistance with latching baby. Baby sleepy at breast but latch for a few sucks. Mom reports baby just had bath. Educated mom on use and cleaning of personal pump. Mom pumped 3 cc of colostrum which was syringe fed to baby. Baby tolerated well. Encouraged mom to pump every 2-3 hours and give colostrum to baby if not BF every 2-3 hours. Encouraged mom to hand express and review provided booklet on BF    Lactation Consult Note    Evaluation Completed: 2019 7:05 PM  Patient Name: Deepti Davies  :  1990  MRN:  [id number]     REFERRAL  INFORMATION:                                         DELIVERY HISTORY:          Skin to skin initiation date/time: 2019  5:15 PM   Skin to skin end date/time:              MATERNAL ASSESSMENT:                               INFANT ASSESSMENT:  Information for the patient's :  Pascual Davies [[id number]]   No past medical history on file.                                                                                                                                MATERNAL INFANT FEEDING:                                                                       EQUIPMENT TYPE:                                 BREAST PUMPING:          LACTATION REFERRALS:

## 2019-12-22 NOTE — LACTATION NOTE
Mom reports baby BF a lot last night. Educated on baby's expected output and weight gain. Gave [facility] card and encouraged f/u  Lactation Consult Note    Evaluation Completed: 2019 8:15 AM  Patient Name: Deepti Davies  :  1990  MRN:  [id number]     REFERRAL  INFORMATION:                                         DELIVERY HISTORY:          Skin to skin initiation date/time: 2019  5:15 PM   Skin to skin end date/time:              MATERNAL ASSESSMENT:                               INFANT ASSESSMENT:  Information for the patient's :  Pascual Davies [[id number]]   No past medical history on file.                                                                                                                                MATERNAL INFANT FEEDING:                                                                       EQUIPMENT TYPE:                                 BREAST PUMPING:          LACTATION REFERRALS:

## 2019-12-22 NOTE — DISCHARGE SUMMARY
Vaginal delivery Discharge Summary      Date of Admission: 2019    Date of Discharge:  2019    Patient: Deepti Davies      MR#:[id number]    Surgeon/OB: Lili Hurt     Discharge Diagnosis: Vaginal Delivery at 40w0d, uncomplicated recovery    Procedures:  Vaginal, Spontaneous     2019    5:11 PM      Anesthesia:  Epidural     Presenting Problem/History of Present Illness  Pregnancy [Z34.90]     Patient Active Problem List   Diagnosis   • Pregnancy   • Rubella non-immune status, antepartum   • Diet controlled gestational diabetes mellitus (GDM), antepartum       Hospital Course  Patient is a 29 y.o. female  at 40w0d status post vaginal delivery. She was admitted for induction of labor with gestational diabetes which was uncomplicated.  Uneventful recovery.  Patient is ambulating, tolerating a regular diet.  Perineum is intact.    Infant:   female  fetus 3415 g (7 lb 8.5 oz)  with Apgar scores of 9  , 9   at five minutes.    Condition on Discharge:  Stable    Vital Signs  Temp:  [97.8 °F (36.6 °C)-97.9 °F (36.6 °C)] 97.8 °F (36.6 °C)  Heart Rate:  [72-77] 73  Resp:  [16-18] 16  BP: (115-123)/(72-80) 115/72    Lab Results   Component Value Date    WBC 10.36 2019    HGB 12.3 2019    HCT 36.3 2019    MCV 87.5 2019     2019       Discharge Disposition  Home or Self Care    Discharge Medications     Discharge Medications      New Medications      Instructions Start Date   ibuprofen 600 MG tablet  Commonly known as:  ADVIL,MOTRIN   600 mg, Oral, Every 6 Hours PRN      oxyCODONE-acetaminophen 5-325 MG per tablet  Commonly known as:  PERCOCET   1 tablet, Oral, Every 6 Hours PRN         Continue These Medications      Instructions Start Date   ferrous sulfate 325 (65 FE) MG tablet   325 mg, Oral, Daily      ONE TOUCH ULTRA 2 w/Device kit   TEST BS QID      onetouch ultrasoft lancets   TEST BLOOD SUGAR QID      PNV PO   Oral         Stop These Medications     ONE TOUCH ULTRA TEST test strip  Generic drug:  glucose blood            Discharge Diet: Regular    Activity at Discharge:   Activity Instructions     Pelvic Rest      Nothing in the vagina for six weeks          Follow-up Appointments  Future Appointments   Date Time Provider Department Center   1/30/2020 11:45 AM Josh Marcelo MD [facility] None     Additional Instructions for the Follow-ups that You Need to Schedule     Call MD With Problems / Concerns   As directed      Monitor your bleeding and if is too heavy then please call the office or come to the hospital.   If you have fever, abdominal tenderness, or otherwise feel unwell please call or come to the hospital.  Watch for signs and symptoms of postpartum preeclamspia which include: high blood pressure (systolic 140 or greater, diastolic 90 or greater), terrible headache not relieved with tylenol, upper abdominal pain, persistently seeing spots or flashing lights, persistent nausea and vomiting. If you have any of these please call the office or come to the hospital.    Order Comments:  Monitor your bleeding and if is too heavy then please call the office or come to the hospital. If you have fever, abdominal tenderness, or otherwise feel unwell please call or come to the hospital. Watch for signs and symptoms of postpartum preeclamspia which include: high blood pressure (systolic 140 or greater, diastolic 90 or greater), terrible headache not relieved with tylenol, upper abdominal pain, persistently seeing spots or flashing lights, persistent nausea and vomiting. If you have any of these please call the office or come to the hospital.          Discharge Follow-up with Specified Provider: Follow up with your primary OB in 6 weeks   As directed      To:  Follow up with your primary OB in 6 weeks             Rubella nonimmune and MMR was administered    Suha Michel MD  12/22/19  10:27 AM

## 2019-12-26 ENCOUNTER — TELEPHONE (OUTPATIENT)
Dept: OBSTETRICS AND GYNECOLOGY | Age: 29
End: 2019-12-26

## 2019-12-26 NOTE — TELEPHONE ENCOUNTER
Dr. Marcelo pt. (aware Dr. ROLLINS out of office todat)  Pt reports PP 6 days  Passed golf ball size clot 12/25/19 pm she is concerned. Pls advise.  Appt. 1/30/19 for 6 wk PP.  Does she need to be seen sooner.    CC: andrea ARAMBULA #[phone]

## 2019-12-26 NOTE — TELEPHONE ENCOUNTER
If it is an isolated clot, I would monitor. If pt feels weak, dizzy and/or bleeding worsens, can either f/u here or go to ER

## 2020-01-30 ENCOUNTER — POSTPARTUM VISIT (OUTPATIENT)
Dept: OBSTETRICS AND GYNECOLOGY | Age: 30
End: 2020-01-30

## 2020-01-30 VITALS
HEIGHT: 65 IN | SYSTOLIC BLOOD PRESSURE: 108 MMHG | BODY MASS INDEX: 27.49 KG/M2 | DIASTOLIC BLOOD PRESSURE: 72 MMHG | WEIGHT: 165 LBS

## 2020-01-30 DIAGNOSIS — O24.419 GESTATIONAL DIABETES MELLITUS (GDM), ANTEPARTUM, GESTATIONAL DIABETES METHOD OF CONTROL UNSPECIFIED: Primary | ICD-10-CM

## 2020-01-30 PROBLEM — O09.899 RUBELLA NON-IMMUNE STATUS, ANTEPARTUM: Status: RESOLVED | Noted: 2019-04-30 | Resolved: 2020-01-30

## 2020-01-30 PROBLEM — O24.410 DIET CONTROLLED GESTATIONAL DIABETES MELLITUS (GDM), ANTEPARTUM: Status: RESOLVED | Noted: 2019-10-15 | Resolved: 2020-01-30

## 2020-01-30 PROBLEM — Z28.39 RUBELLA NON-IMMUNE STATUS, ANTEPARTUM: Status: RESOLVED | Noted: 2019-04-30 | Resolved: 2020-01-30

## 2020-01-30 PROBLEM — Z34.90 PREGNANCY: Status: RESOLVED | Noted: 2019-04-29 | Resolved: 2020-01-30

## 2020-01-30 PROCEDURE — 0503F POSTPARTUM CARE VISIT: CPT | Performed by: OBSTETRICS & GYNECOLOGY

## 2020-01-30 NOTE — PROGRESS NOTES
"Subjective   Deepti Davies is a 29 y.o. female who presents for a postpartum visit. She is 6 weeks postpartum following a spontaneous vaginal delivery. I have fully reviewed the prenatal and intrapartum course. Postpartum course has been uneventful. Baby is feeding by breast. Bleeding has been normal in amount and decreasing. Bowel function is normal. Bladder function is normal. Patient not sexually active at this time. Contraception method is discussed. Postpartum depression screening: negative.    The following portions of the patient's history were reviewed and updated as appropriate: allergies, current medications,and problem list.    Review of Systems  Pertinent items are noted in HPI.    Objective   /72   Ht 165.1 cm (65\")   Wt 74.8 kg (165 lb)   LMP 03/10/2019 (Exact Date) Comment: q 35 days  Breastfeeding Yes   BMI 27.46 kg/m²    General:  Alert and oriented, NAD    Breasts:         Heart:     Abdomen: Normal findings, nontender    Vulva: Normal, well-healed    Vagina: No lesions or abnormal discharge   Cervix:  Normal with no cervical motion tenderness   Corpus: Normal for post partum visit   Adnexa:  Non tender, non enlarged         Assessment/Plan     Normal postpartum exam. Pap smear not  done at today's visit.    1. Contraception: condoms   2. Slow return to normal activities reviewed. Continue prenatal vitamins.  3. Follow up in 12 months or sooner as needed.           "

## 2020-02-19 ENCOUNTER — OFFICE VISIT (OUTPATIENT)
Dept: INTERNAL MEDICINE | Facility: CLINIC | Age: 30
End: 2020-02-19

## 2020-02-19 VITALS
OXYGEN SATURATION: 98 % | TEMPERATURE: 97.7 F | HEART RATE: 64 BPM | SYSTOLIC BLOOD PRESSURE: 106 MMHG | DIASTOLIC BLOOD PRESSURE: 70 MMHG | WEIGHT: 164 LBS | HEIGHT: 65 IN | BODY MASS INDEX: 27.32 KG/M2

## 2020-02-19 DIAGNOSIS — Z00.00 ANNUAL PHYSICAL EXAM: Primary | ICD-10-CM

## 2020-02-19 DIAGNOSIS — Z76.89 ENCOUNTER TO ESTABLISH CARE: ICD-10-CM

## 2020-02-19 DIAGNOSIS — R10.13 EPIGASTRIC PAIN: ICD-10-CM

## 2020-02-19 PROCEDURE — 93000 ELECTROCARDIOGRAM COMPLETE: CPT | Performed by: NURSE PRACTITIONER

## 2020-02-19 PROCEDURE — 99395 PREV VISIT EST AGE 18-39: CPT | Performed by: NURSE PRACTITIONER

## 2020-02-19 NOTE — PATIENT INSTRUCTIONS
Take Pepcid (famotidine) daily.    Can also try Align or Culturelle probiotic.    Heartburn  Heartburn is a type of pain or discomfort that can happen in the throat or chest. It is often described as a burning pain. It may also cause a bad, acid-like taste in the mouth. Heartburn may feel worse when you lie down or bend over, and it is often worse at night. Heartburn may be caused by stomach contents that move back up into the esophagus (reflux).  Follow these instructions at home:  Eating and drinking    · Avoid certain foods and drinks as told by your health care provider. This may include:  ? Coffee and tea (with or without caffeine).  ? Drinks that contain alcohol.  ? Energy drinks and sports drinks.  ? Carbonated drinks or sodas.  ? Chocolate and cocoa.  ? Peppermint and mint flavorings.  ? Garlic and onions.  ? Horseradish.  ? Spicy and acidic foods, including peppers, chili powder, leblanc powder, vinegar, hot sauces, and barbecue sauce.  ? Citrus fruit juices and citrus fruits, such as oranges, haylee, and limes.  ? Tomato-based foods, such as red sauce, chili, salsa, and pizza with red sauce.  ? Fried and fatty foods, such as donuts, french fries, potato chips, and high-fat dressings.  ? High-fat meats, such as hot dogs and fatty cuts of red and white meats, such as rib eye steak, sausage, ham, and hyde.  ? High-fat dairy items, such as whole milk, butter, and cream cheese.  · Eat small, frequent meals instead of large meals.  · Avoid drinking large amounts of liquid with your meals.  · Avoid eating meals during the 2-3 hours before bedtime.  · Avoid lying down right after you eat.  · Do not exercise right after you eat.  Lifestyle         · If you are overweight, reduce your weight to an amount that is healthy for you. Ask your health care provider for guidance about a safe weight loss goal.  · Do not use any products that contain nicotine or tobacco, such as cigarettes, e-cigarettes, and chewing tobacco.  These can make your symptoms worse. If you need help quitting, ask your health care provider.  · Wear loose-fitting clothing. Do not wear anything tight around your waist that causes pressure on your abdomen.  · Raise (elevate) the head of your bed about 6 inches (15 cm) when you sleep.  · Try to reduce your stress, such as with yoga or meditation. If you need help reducing stress, ask your health care provider.  General instructions  · Pay attention to any changes in your symptoms.  · Take over-the-counter and prescription medicines only as told by your health care provider.  ? Do not take aspirin, ibuprofen, or other NSAIDs unless your health care provider told you to do so.  ? Stop medicines only as told by your health care provider. If you stop taking some medicines too quickly, your symptoms may get worse.  · Keep all follow-up visits as told by your health care provider. This is important.  Contact a health care provider if:  · You have new symptoms.  · You have unexplained weight loss.  · You have difficulty swallowing, or it hurts to swallow.  · You have wheezing or a persistent cough.  · Your symptoms do not improve with treatment.  · You have frequent heartburn for more than 2 weeks.  Get help right away if:  · You have pain in your arms, neck, jaw, teeth, or back.  · You feel sweaty, dizzy, or light-headed.  · You have chest pain or shortness of breath.  · You vomit and your vomit looks like blood or coffee grounds.  · Your stool is bloody or black.  These symptoms may represent a serious problem that is an emergency. Do not wait to see if the symptoms will go away. Get medical help right away. Call your local emergency services (911 in the [place] Do not drive yourself to the hospital.  Summary  · Heartburn is a type of pain or discomfort that can happen in the throat or chest. It is often described as a burning pain. It may also cause a bad, acid-like taste in the mouth.  · Avoid certain foods and drinks  as told by your health care provider.  · Take over-the-counter and prescription medicines only as told by your health care provider. Do not take aspirin, ibuprofen, or other NSAIDs unless your health care provider told you to do so.  · Contact a health care provider if your symptoms do not improve or they get worse.  This information is not intended to replace advice given to you by your health care provider. Make sure you discuss any questions you have with your health care provider.  Document Released: 05/06/2010 Document Revised: 05/20/2019 Document Reviewed: 05/20/2019  [employer] Interactive Patient Education © 2020 [employer]

## 2020-02-19 NOTE — PROGRESS NOTES
Subjective     Deepti Davies is a 29 y.o. female.         She presents to establish care, annual exam, and intermittent epigastric pain that is more prevalent after meals and when laying down. She is currently breastfeeding and on maternity leave from being a vocal . She feels well overall and has no other complaints today. She was diagnosed with gestational DM. She does try to eat a healthy diet since she is breastfeeding. She does try to exercise routinely. Dental and vision exams are not UTD. Pap is UTD, she is followed by GYN.    Abdominal Pain   This is a new problem. The current episode started 1 to 4 weeks ago. The problem occurs intermittently. The patient is experiencing no pain (no pain currently but can become severe). Pertinent negatives include no constipation, diarrhea, dysuria, fever, frequency, headaches, nausea or vomiting.        The following portions of the patient's history were reviewed and updated as appropriate: allergies, current medications, past social history and problem list.    Past Medical History:   Diagnosis Date   • Anxiety     after mother's death - overdose          Current Outpatient Medications:   •  Prenatal Vit w/Oy-Fombuaqxc-UV (PNV PO), Take  by mouth., Disp: , Rfl:     No Known Allergies    Review of Systems   Constitutional: Negative for fatigue and fever.   HENT: Negative for congestion, hearing loss and trouble swallowing.    Eyes: Negative for visual disturbance.   Respiratory: Negative for apnea, cough, chest tightness, shortness of breath and wheezing.    Cardiovascular: Negative for chest pain, palpitations and leg swelling.   Gastrointestinal: Positive for abdominal pain. Negative for abdominal distention, constipation, diarrhea, nausea and vomiting.   Endocrine: Negative for cold intolerance, heat intolerance, polydipsia, polyphagia and polyuria.   Genitourinary: Negative for difficulty urinating, dysuria, frequency and urgency.   Musculoskeletal: Negative for  "gait problem.   Skin: Negative for pallor and rash.   Neurological: Negative for speech difficulty, weakness and headaches.   Psychiatric/Behavioral: Negative for agitation, behavioral problems, confusion, dysphoric mood, sleep disturbance and suicidal ideas. The patient is not nervous/anxious.        Objective     /70   Pulse 64   Temp 97.7 °F (36.5 °C)   Ht 163.8 cm (64.5\")   Wt 74.4 kg (164 lb)   SpO2 98%   BMI 27.72 kg/m²   Wt Readings from Last 3 Encounters:   02/19/20 74.4 kg (164 lb)   01/30/20 74.8 kg (165 lb)   12/19/19 81.4 kg (179 lb 6.4 oz)     Temp Readings from Last 3 Encounters:   02/19/20 97.7 °F (36.5 °C)   12/22/19 97.8 °F (36.6 °C) (Oral)     BP Readings from Last 3 Encounters:   02/19/20 106/70   01/30/20 108/72   12/22/19 115/72     Pulse Readings from Last 3 Encounters:   02/19/20 64   12/22/19 73       Physical Exam   Constitutional: She is oriented to person, place, and time. She appears well-developed and well-nourished.   HENT:   Head: Normocephalic and atraumatic.   Right Ear: Hearing and tympanic membrane normal.   Left Ear: Hearing and tympanic membrane normal.   Nose: Nose normal.   Mouth/Throat: Uvula is midline, oropharynx is clear and moist and mucous membranes are normal. No tonsillar exudate.   Eyes: Pupils are equal, round, and reactive to light. Conjunctivae, EOM and lids are normal.   Neck: Normal range of motion. Carotid bruit is not present. No thyromegaly present.   Cardiovascular: Normal rate, regular rhythm, normal heart sounds and intact distal pulses.   No murmur heard.  Pulses:       Carotid pulses are 2+ on the right side, and 2+ on the left side.       Dorsalis pedis pulses are 2+ on the right side, and 2+ on the left side.   Pulmonary/Chest: Effort normal and breath sounds normal. No respiratory distress. She has no decreased breath sounds. She has no wheezes. She has no rhonchi.   Abdominal: Soft. Bowel sounds are normal. She exhibits no distension. There " is no hepatosplenomegaly. There is no tenderness. There is no rebound and no guarding.   Musculoskeletal: Normal range of motion. She exhibits no edema, tenderness or deformity.   Lymphadenopathy:        Head (right side): No submental, no submandibular and no tonsillar adenopathy present.        Head (left side): No submental, no submandibular and no tonsillar adenopathy present.   Neurological: She is alert and oriented to person, place, and time. She has normal strength and normal reflexes. No cranial nerve deficit or sensory deficit. Coordination and gait normal.   Skin: Skin is warm, dry and intact.   Psychiatric: She has a normal mood and affect. Her speech is normal and behavior is normal. Judgment and thought content normal. She is attentive.   Vitals reviewed.        Assessment/Plan     Deepti was seen today for establish care and abdominal pain.    Diagnoses and all orders for this visit:    Annual physical exam  -     Comprehensive Metabolic Panel  -     Lipid Panel  -     Urinalysis With Culture If Indicated -  -     CBC & Differential  -     TSH Rfx On Abnormal To Free T4  -     ECG 12 Lead    Encounter to establish care    Epigastric pain  Comments:  she will take daily pepcid, limit spicy and acidic foods, heartburn education given, handout given. She will f/u for worsening sx.    Encouraged healthy diet, regular exercise, maintenance of healthy weight, good sleep habits, avoidance of tobacco products and excessive alcohol.    All vaccines are UTD.      ECG 12 Lead  Date/Time: 2/19/2020 12:50 PM  Performed by: Deepti Mitchell APRN  Authorized by: Deepti Mitchell APRN   Previous ECG: no previous ECG available  Rhythm: sinus rhythm  Rate: normal  Conduction: conduction normal  ST Segments: ST segments normal  T Waves: T waves normal  QRS axis: normal  Other: no other findings    Clinical impression: normal ECG        She will f/u in 1 year for CPE.

## 2020-02-23 LAB
ALBUMIN SERPL-MCNC: 4.3 G/DL (ref 3.5–5.2)
ALBUMIN/GLOB SERPL: 1.4 G/DL
ALP SERPL-CCNC: 79 U/L (ref 39–117)
ALT SERPL-CCNC: 18 U/L (ref 1–33)
APPEARANCE UR: ABNORMAL
AST SERPL-CCNC: 16 U/L (ref 1–32)
BACTERIA #/AREA URNS HPF: ABNORMAL /HPF
BACTERIA UR CULT: NORMAL
BACTERIA UR CULT: NORMAL
BASOPHILS # BLD AUTO: 0.04 10*3/MM3 (ref 0–0.2)
BASOPHILS NFR BLD AUTO: 0.5 % (ref 0–1.5)
BILIRUB SERPL-MCNC: 0.5 MG/DL (ref 0.2–1.2)
BILIRUB UR QL STRIP: NEGATIVE
BUN SERPL-MCNC: 17 MG/DL (ref 6–20)
BUN/CREAT SERPL: 21.8 (ref 7–25)
CALCIUM SERPL-MCNC: 9.6 MG/DL (ref 8.6–10.5)
CHLORIDE SERPL-SCNC: 102 MMOL/L (ref 98–107)
CHOLEST SERPL-MCNC: 222 MG/DL (ref 0–200)
CO2 SERPL-SCNC: 25.3 MMOL/L (ref 22–29)
COLOR UR: YELLOW
CREAT SERPL-MCNC: 0.78 MG/DL (ref 0.57–1)
CRYSTALS URNS MICRO: ABNORMAL
EOSINOPHIL # BLD AUTO: 1.73 10*3/MM3 (ref 0–0.4)
EOSINOPHIL NFR BLD AUTO: 23.2 % (ref 0.3–6.2)
EPI CELLS #/AREA URNS HPF: ABNORMAL /HPF (ref 0–10)
ERYTHROCYTE [DISTWIDTH] IN BLOOD BY AUTOMATED COUNT: 12 % (ref 12.3–15.4)
GLOBULIN SER CALC-MCNC: 3 GM/DL
GLUCOSE SERPL-MCNC: 86 MG/DL (ref 65–99)
GLUCOSE UR QL: NEGATIVE
HCT VFR BLD AUTO: 40.2 % (ref 34–46.6)
HDLC SERPL-MCNC: 57 MG/DL (ref 40–60)
HGB BLD-MCNC: 13.1 G/DL (ref 12–15.9)
HGB UR QL STRIP: NEGATIVE
IMM GRANULOCYTES # BLD AUTO: 0.01 10*3/MM3 (ref 0–0.05)
IMM GRANULOCYTES NFR BLD AUTO: 0.1 % (ref 0–0.5)
KETONES UR QL STRIP: NEGATIVE
LDLC SERPL CALC-MCNC: 144 MG/DL (ref 0–100)
LEUKOCYTE ESTERASE UR QL STRIP: ABNORMAL
LYMPHOCYTES # BLD AUTO: 2.23 10*3/MM3 (ref 0.7–3.1)
LYMPHOCYTES NFR BLD AUTO: 29.9 % (ref 19.6–45.3)
MCH RBC QN AUTO: 28.3 PG (ref 26.6–33)
MCHC RBC AUTO-ENTMCNC: 32.6 G/DL (ref 31.5–35.7)
MCV RBC AUTO: 86.8 FL (ref 79–97)
MICRO URNS: ABNORMAL
MONOCYTES # BLD AUTO: 0.43 10*3/MM3 (ref 0.1–0.9)
MONOCYTES NFR BLD AUTO: 5.8 % (ref 5–12)
NEUTROPHILS # BLD AUTO: 3.03 10*3/MM3 (ref 1.7–7)
NEUTROPHILS NFR BLD AUTO: 40.5 % (ref 42.7–76)
NITRITE UR QL STRIP: NEGATIVE
NRBC BLD AUTO-RTO: 0 /100 WBC (ref 0–0.2)
PH UR STRIP: 5.5 [PH] (ref 5–7.5)
PLATELET # BLD AUTO: 404 10*3/MM3 (ref 140–450)
POTASSIUM SERPL-SCNC: 4.4 MMOL/L (ref 3.5–5.2)
PROT SERPL-MCNC: 7.3 G/DL (ref 6–8.5)
PROT UR QL STRIP: NEGATIVE
RBC # BLD AUTO: 4.63 10*6/MM3 (ref 3.77–5.28)
RBC #/AREA URNS HPF: ABNORMAL /HPF (ref 0–2)
SODIUM SERPL-SCNC: 141 MMOL/L (ref 136–145)
SP GR UR: 1.02 (ref 1–1.03)
TRIGL SERPL-MCNC: 107 MG/DL (ref 0–150)
TSH SERPL DL<=0.005 MIU/L-ACNC: 3.36 UIU/ML (ref 0.27–4.2)
UNIDENT CRYS URNS QL MICRO: PRESENT /LPF
URINALYSIS REFLEX: ABNORMAL
UROBILINOGEN UR STRIP-MCNC: 0.2 MG/DL (ref 0.2–1)
VLDLC SERPL CALC-MCNC: 21.4 MG/DL
WBC # BLD AUTO: 7.47 10*3/MM3 (ref 3.4–10.8)
WBC #/AREA URNS HPF: >30 /HPF (ref 0–5)

## 2020-03-03 ENCOUNTER — OFFICE VISIT (OUTPATIENT)
Dept: INTERNAL MEDICINE | Facility: CLINIC | Age: 30
End: 2020-03-03

## 2020-03-03 VITALS
HEART RATE: 66 BPM | HEIGHT: 65 IN | WEIGHT: 162 LBS | TEMPERATURE: 97.7 F | SYSTOLIC BLOOD PRESSURE: 114 MMHG | DIASTOLIC BLOOD PRESSURE: 64 MMHG | OXYGEN SATURATION: 98 % | BODY MASS INDEX: 26.99 KG/M2

## 2020-03-03 DIAGNOSIS — R10.13 EPIGASTRIC PAIN: Primary | ICD-10-CM

## 2020-03-03 PROCEDURE — 99213 OFFICE O/P EST LOW 20 MIN: CPT | Performed by: NURSE PRACTITIONER

## 2020-03-03 RX ORDER — PANTOPRAZOLE SODIUM 40 MG/1
40 TABLET, DELAYED RELEASE ORAL DAILY
Qty: 30 TABLET | Refills: 3 | Status: SHIPPED | OUTPATIENT
Start: 2020-03-03 | End: 2020-08-04

## 2020-03-03 NOTE — PROGRESS NOTES
"Subjective     Deepti Davies is a 29 y.o. female.         She presents for continued and worsening epigastric pain despite pepcid, diet changes, and lifestyle modifications for the last 2 weeks.    Abdominal Pain   This is a new problem. The current episode started more than 1 month ago. The problem occurs intermittently. The problem has been gradually worsening. The pain is located in the epigastric region. The pain is moderate. The quality of the pain is aching. The abdominal pain does not radiate. Associated symptoms include constipation (intermittent) and nausea. Pertinent negatives include no diarrhea, fever, melena, vomiting or weight loss. The pain is aggravated by eating. The pain is relieved by nothing. She has tried H2 blockers for the symptoms. The treatment provided no relief.        The following portions of the patient's history were reviewed and updated as appropriate: allergies, current medications, past social history and problem list.    Review of Systems   Constitutional: Negative for fever and weight loss.   Respiratory: Negative for shortness of breath.    Cardiovascular: Negative for chest pain.   Gastrointestinal: Positive for abdominal pain, constipation (intermittent) and nausea. Negative for abdominal distention, blood in stool, diarrhea, melena and vomiting.       Objective     /64   Pulse 66   Temp 97.7 °F (36.5 °C)   Ht 163.8 cm (64.5\")   Wt 73.5 kg (162 lb)   SpO2 98%   BMI 27.38 kg/m²     Physical Exam   Constitutional: She appears well-developed and well-nourished.   HENT:   Head: Normocephalic and atraumatic.   Cardiovascular: Normal rate, regular rhythm and normal heart sounds.   No murmur heard.  Pulmonary/Chest: Effort normal and breath sounds normal. No respiratory distress.   Abdominal: Soft. Normal appearance and bowel sounds are normal. There is tenderness (mild) in the epigastric area.   Musculoskeletal: Normal range of motion.   Neurological: She is alert. "   Skin: Skin is warm and dry.   Psychiatric: She has a normal mood and affect. Her behavior is normal. Judgment and thought content normal.   Vitals reviewed.      Assessment/Plan     Deepti was seen today for abdominal pain.    Diagnoses and all orders for this visit:    Epigastric pain  -     US Gallbladder; Future  -     pantoprazole (PROTONIX) 40 MG EC tablet; Take 1 tablet by mouth Daily.    Continue lifestyle modifications to combat indigestion and heartburn.    Gallbladder vs. GERD vs. Ulcer    Educated on the process of gallbladder workup.    F/u TBD. Labs normal from 2 weeks ago.    Return for worsening of sx.

## 2020-03-09 ENCOUNTER — HOSPITAL ENCOUNTER (OUTPATIENT)
Dept: ULTRASOUND IMAGING | Facility: HOSPITAL | Age: 30
Discharge: HOME | End: 2020-03-09
Admitting: NURSE PRACTITIONER

## 2020-03-09 DIAGNOSIS — R10.13 EPIGASTRIC PAIN: ICD-10-CM

## 2020-03-09 PROCEDURE — 76705 ECHO EXAM OF ABDOMEN: CPT

## 2020-08-04 ENCOUNTER — OFFICE VISIT (OUTPATIENT)
Dept: INTERNAL MEDICINE | Facility: CLINIC | Age: 30
End: 2020-08-04

## 2020-08-04 VITALS
DIASTOLIC BLOOD PRESSURE: 62 MMHG | SYSTOLIC BLOOD PRESSURE: 104 MMHG | HEART RATE: 69 BPM | BODY MASS INDEX: 24.99 KG/M2 | HEIGHT: 65 IN | WEIGHT: 150 LBS | OXYGEN SATURATION: 99 %

## 2020-08-04 DIAGNOSIS — R30.0 BURNING WITH URINATION: ICD-10-CM

## 2020-08-04 DIAGNOSIS — N39.0 ACUTE UTI: Primary | ICD-10-CM

## 2020-08-04 LAB
BACTERIA UR QL AUTO: ABNORMAL /HPF
BILIRUB UR QL STRIP: NEGATIVE
CLARITY UR: ABNORMAL
COLOR UR: YELLOW
GLUCOSE UR STRIP-MCNC: NEGATIVE MG/DL
HGB UR QL STRIP.AUTO: ABNORMAL
HYALINE CASTS UR QL AUTO: ABNORMAL /LPF
KETONES UR QL STRIP: NEGATIVE
LEUKOCYTE ESTERASE UR QL STRIP.AUTO: ABNORMAL
MUCOUS THREADS URNS QL MICRO: ABNORMAL /HPF
NITRITE UR QL STRIP: NEGATIVE
PH UR STRIP.AUTO: 5.5 [PH] (ref 5–8)
PROT UR QL STRIP: NEGATIVE
RBC # UR: ABNORMAL /HPF
REF LAB TEST METHOD: ABNORMAL
SP GR UR STRIP: 1.02 (ref 1–1.03)
SQUAMOUS #/AREA URNS HPF: ABNORMAL /HPF
UROBILINOGEN UR QL STRIP: ABNORMAL
WBC UR QL AUTO: ABNORMAL /HPF

## 2020-08-04 PROCEDURE — 99213 OFFICE O/P EST LOW 20 MIN: CPT | Performed by: NURSE PRACTITIONER

## 2020-08-04 PROCEDURE — 81001 URINALYSIS AUTO W/SCOPE: CPT | Performed by: NURSE PRACTITIONER

## 2020-08-04 RX ORDER — NITROFURANTOIN 25; 75 MG/1; MG/1
100 CAPSULE ORAL EVERY 12 HOURS SCHEDULED
Qty: 10 CAPSULE | Refills: 0 | Status: SHIPPED | OUTPATIENT
Start: 2020-08-04 | End: 2020-08-09

## 2020-08-04 NOTE — PROGRESS NOTES
"Subjective     Deepti Davies is a 29 y.o. female.         She presents for dysuria x 1.5 weeks. She does report blood in her urine last week but no blood since. She did have a baby in December and has not started her period since then.    Dysuria    This is a new problem. The current episode started in the past 7 days. The problem occurs every urination. The problem has been waxing and waning. The quality of the pain is described as burning. The pain is at a severity of 4/10. The pain is mild. There has been no fever. She is sexually active. There is no history of pyelonephritis. Associated symptoms include chills, a discharge, frequency, hematuria, nausea, a possible pregnancy and urgency. Pertinent negatives include no flank pain, hesitancy, sweats or vomiting. She has tried increased fluids for the symptoms. The treatment provided mild relief.        The following portions of the patient's history were reviewed and updated as appropriate: allergies, current medications, past social history and problem list.    Past Medical History:   Diagnosis Date   • Anxiety     after mother's death - overdose        No current outpatient medications on file.    No Known Allergies    Review of Systems   Constitutional: Positive for chills. Negative for fatigue and fever.   Respiratory: Negative for shortness of breath.    Cardiovascular: Negative for chest pain.   Gastrointestinal: Positive for nausea. Negative for abdominal pain and vomiting.   Genitourinary: Positive for dysuria, frequency, hematuria and urgency. Negative for flank pain and hesitancy.       Objective     /62   Pulse 69   Ht 163.8 cm (64.5\")   Wt 68 kg (150 lb)   SpO2 99%   BMI 25.35 kg/m²   Wt Readings from Last 3 Encounters:   08/04/20 68 kg (150 lb)   03/03/20 73.5 kg (162 lb)   02/19/20 74.4 kg (164 lb)     Temp Readings from Last 3 Encounters:   03/03/20 97.7 °F (36.5 °C)   02/19/20 97.7 °F (36.5 °C)   12/22/19 97.8 °F (36.6 °C) (Oral)     BP " Readings from Last 3 Encounters:   08/04/20 104/62   03/03/20 114/64   02/19/20 106/70     Pulse Readings from Last 3 Encounters:   08/04/20 69   03/03/20 66   02/19/20 64       Physical Exam   Constitutional: She appears well-developed and well-nourished.   HENT:   Head: Normocephalic and atraumatic.   Pulmonary/Chest: Effort normal. No respiratory distress.   Musculoskeletal: Normal range of motion.   Neurological: She is alert.   Skin: Skin is warm and dry.   Psychiatric: She has a normal mood and affect. Her behavior is normal. Judgment and thought content normal.   Vitals reviewed.      Assessment/Plan     Deepti was seen today for urinary tract infection.    Diagnoses and all orders for this visit:    Acute UTI  -     nitrofurantoin, macrocrystal-monohydrate, (MACROBID) 100 MG capsule; Take 1 capsule by mouth Every 12 (Twelve) Hours for 5 days.    Burning with urination  -     Urinalysis With Culture If Indicated -; Future  -     Urinalysis With Culture If Indicated - Urine, Clean Catch  -     Urinalysis, Microscopic Only - Urine, Clean Catch; Future  -     Urinalysis, Microscopic Only - Urine, Clean Catch  -     Urine Culture - Urine,; Future  -     Urine Culture - Urine, Urine, Clean Catch      UA revealed probable UTI, discussed with pt, will send for culture.    Continue increased fluids.    Return for worsening of sx.

## 2020-08-06 LAB
BACTERIA UR CULT: NO GROWTH
BACTERIA UR CULT: NORMAL

## 2020-08-12 LAB — REF LAB TEST METHOD: NORMAL

## 2020-08-25 ENCOUNTER — TELEPHONE (OUTPATIENT)
Dept: OBSTETRICS AND GYNECOLOGY | Age: 30
End: 2020-08-25

## 2020-08-25 NOTE — TELEPHONE ENCOUNTER
Please notify this can vary.  Sometimes it will start while you are still breast-feeding but sometimes it may not start until after breast-feeding is completed.  Both of these ways are normal.

## 2020-08-25 NOTE — TELEPHONE ENCOUNTER
Patient called, patient is 8 months postpartum and has been breastfeeding up until last month. Patient wanted to know when she should she expect to start her cycle again after breastfeeding?

## 2020-08-25 NOTE — TELEPHONE ENCOUNTER
Pt notd that it can take more than 1 month to have a menses after finishing Bf. Pt is using condoms for BC so she is not worried about pregnancy.    3

## 2020-08-25 NOTE — TELEPHONE ENCOUNTER
Pt returned call, completed breast feeding a month ago.   She would like to know how long it takes before her cycle will start back up.    Pt also would like to know when she should start worrying if she doesn't start?            [phone]

## 2021-02-11 ENCOUNTER — OFFICE VISIT (OUTPATIENT)
Dept: INTERNAL MEDICINE | Facility: CLINIC | Age: 31
End: 2021-02-11

## 2021-02-11 VITALS
TEMPERATURE: 98.4 F | HEIGHT: 65 IN | HEART RATE: 94 BPM | WEIGHT: 144 LBS | BODY MASS INDEX: 23.99 KG/M2 | OXYGEN SATURATION: 98 % | DIASTOLIC BLOOD PRESSURE: 68 MMHG | SYSTOLIC BLOOD PRESSURE: 114 MMHG

## 2021-02-11 DIAGNOSIS — R82.90 ABNORMAL URINE FINDINGS: ICD-10-CM

## 2021-02-11 DIAGNOSIS — M54.50 ACUTE BILATERAL LOW BACK PAIN WITHOUT SCIATICA: ICD-10-CM

## 2021-02-11 DIAGNOSIS — R53.83 OTHER FATIGUE: Primary | ICD-10-CM

## 2021-02-11 DIAGNOSIS — K59.00 CONSTIPATION, UNSPECIFIED CONSTIPATION TYPE: ICD-10-CM

## 2021-02-11 DIAGNOSIS — R14.0 BLOATING: ICD-10-CM

## 2021-02-11 LAB
B-HCG UR QL: NEGATIVE
BACTERIA UR QL AUTO: ABNORMAL /HPF
BILIRUB UR QL STRIP: NEGATIVE
CLARITY UR: ABNORMAL
COLOR UR: YELLOW
GLUCOSE UR STRIP-MCNC: NEGATIVE MG/DL
HGB UR QL STRIP.AUTO: NEGATIVE
HYALINE CASTS UR QL AUTO: ABNORMAL /LPF
INTERNAL NEGATIVE CONTROL: NEGATIVE
INTERNAL POSITIVE CONTROL: POSITIVE
KETONES UR QL STRIP: NEGATIVE
LEUKOCYTE ESTERASE UR QL STRIP.AUTO: ABNORMAL
Lab: NORMAL
MUCOUS THREADS URNS QL MICRO: ABNORMAL /HPF
NITRITE UR QL STRIP: NEGATIVE
PH UR STRIP.AUTO: 5.5 [PH] (ref 5–8)
PROT UR QL STRIP: NEGATIVE
RBC # UR: ABNORMAL /HPF
REF LAB TEST METHOD: ABNORMAL
SP GR UR STRIP: >=1.03 (ref 1–1.03)
SQUAMOUS #/AREA URNS HPF: ABNORMAL /HPF
UROBILINOGEN UR QL STRIP: ABNORMAL
WBC UR QL AUTO: ABNORMAL /HPF

## 2021-02-11 PROCEDURE — 81001 URINALYSIS AUTO W/SCOPE: CPT | Performed by: NURSE PRACTITIONER

## 2021-02-11 PROCEDURE — 99213 OFFICE O/P EST LOW 20 MIN: CPT | Performed by: NURSE PRACTITIONER

## 2021-02-11 PROCEDURE — 81025 URINE PREGNANCY TEST: CPT | Performed by: NURSE PRACTITIONER

## 2021-02-11 NOTE — PROGRESS NOTES
"Subjective     Deepti Davies is a 30 y.o. female.         Patient presents with:  Fatigue: has been more tired, constipation and lower back  Constipation: difficulty having bowel movements  Back Pain    She reports she has been increasingly anxious and has some mild mood swings. Her appetite is increased. Her hair is brittle. Spotting a week after her period. Weight has been consistent. She also reports that she has had to decrease the amount of weight that she uses during her workouts. She has also had bloating.    Fatigue  This is a new problem. The current episode started 1 to 4 weeks ago. Associated symptoms include fatigue. Pertinent negatives include no abdominal pain, anorexia, chest pain, chills, congestion, coughing, diaphoresis, fever, headaches, joint swelling, myalgias, nausea, sore throat, swollen glands or urinary symptoms.   Constipation  Associated symptoms include back pain. Pertinent negatives include no abdominal pain, anorexia, fever or nausea.   Back Pain  Pertinent negatives include no abdominal pain, chest pain, fever or headaches.        The following portions of the patient's history were reviewed and updated as appropriate: allergies, current medications, past social history and problem list.    Past Medical History:   Diagnosis Date   • Anxiety     after mother's death - overdose        No current outpatient medications on file.    No Known Allergies    Review of Systems   Constitutional: Positive for fatigue. Negative for chills, diaphoresis and fever.   HENT: Negative for congestion and sore throat.    Respiratory: Negative for cough.    Cardiovascular: Negative for chest pain.   Gastrointestinal: Positive for constipation. Negative for abdominal pain, anorexia and nausea.   Musculoskeletal: Positive for back pain. Negative for joint swelling and myalgias.   Neurological: Negative for headaches.       Objective     /68   Pulse 94   Temp 98.4 °F (36.9 °C)   Ht 163.8 cm (64.5\")   " Wt 65.3 kg (144 lb)   SpO2 98%   BMI 24.34 kg/m²   Wt Readings from Last 3 Encounters:   02/11/21 65.3 kg (144 lb)   08/04/20 68 kg (150 lb)   03/03/20 73.5 kg (162 lb)     Temp Readings from Last 3 Encounters:   02/11/21 98.4 °F (36.9 °C)   03/03/20 97.7 °F (36.5 °C)   02/19/20 97.7 °F (36.5 °C)     BP Readings from Last 3 Encounters:   02/11/21 114/68   08/04/20 104/62   03/03/20 114/64     Pulse Readings from Last 3 Encounters:   02/11/21 94   08/04/20 69   03/03/20 66       Physical Exam  Vitals signs reviewed.   Constitutional:       Appearance: She is well-developed.   HENT:      Head: Normocephalic and atraumatic.   Cardiovascular:      Rate and Rhythm: Normal rate and regular rhythm.      Heart sounds: Normal heart sounds. No murmur.   Pulmonary:      Effort: Pulmonary effort is normal. No respiratory distress.      Breath sounds: Normal breath sounds.   Musculoskeletal: Normal range of motion.   Skin:     General: Skin is warm and dry.   Neurological:      Mental Status: She is alert.   Psychiatric:         Behavior: Behavior normal.         Thought Content: Thought content normal.         Judgment: Judgment normal.         Assessment/Plan     Diagnoses and all orders for this visit:    1. Other fatigue (Primary)  -     T4, Free  -     TSH  -     Vitamin D 25 Hydroxy  -     Vitamin B12 & Folate  -     Comprehensive Metabolic Panel  -     CBC & Differential  -     Urinalysis With Culture If Indicated - Urine, Clean Catch  -     POCT pregnancy, urine  -     Urinalysis, Microscopic Only - Urine, Clean Catch    2. Acute bilateral low back pain without sciatica  -     Urinalysis With Culture If Indicated - Urine, Clean Catch  -     Urinalysis, Microscopic Only - Urine, Clean Catch    3. Constipation, unspecified constipation type  Comments:  Increase water intake, add fiber to diet with through food or over-the-counter supplements    4. Bloating    Pregnancy test is negative, UA did not reveal significant UTI  but will still send for culture to double check, discussed with patient.    Etiology of symptoms is unknown at this point but will check labs and get back with patient with results.    She will schedule a physical in 1 month.             Answers for HPI/ROS submitted by the patient on 2/8/2021   What is the primary reason for your visit?: Other  Please describe your symptoms.: I have a variety of symptoms - one isn’t worse than the other, but feels connected somehow. Lower Back pain, Spotting a week after my period, fatigue, mood swings, appetite change, occasional abdominal pain.  Have you had these symptoms before?: No  How long have you been having these symptoms?: Greater than 2 weeks  Please list any medications you are currently taking for this condition.: N/A  Please describe any probable cause for these symptoms. : It feels hormonal, but I’m not sure how. These symptoms started a week before my last period, so I know I’m not pregnant, but the symptoms resemble pregnancy.

## 2021-02-12 DIAGNOSIS — E03.8 SUBCLINICAL HYPOTHYROIDISM: Primary | ICD-10-CM

## 2021-02-12 LAB
25(OH)D3+25(OH)D2 SERPL-MCNC: 26.9 NG/ML (ref 30–100)
ALBUMIN SERPL-MCNC: 4.4 G/DL (ref 3.5–5.2)
ALBUMIN/GLOB SERPL: 1.7 G/DL
ALP SERPL-CCNC: 58 U/L (ref 39–117)
ALT SERPL-CCNC: 11 U/L (ref 1–33)
AST SERPL-CCNC: 17 U/L (ref 1–32)
BACTERIA UR CULT: NO GROWTH
BACTERIA UR CULT: NORMAL
BASOPHILS # BLD AUTO: 0.04 10*3/MM3 (ref 0–0.2)
BASOPHILS NFR BLD AUTO: 0.6 % (ref 0–1.5)
BILIRUB SERPL-MCNC: <0.2 MG/DL (ref 0–1.2)
BUN SERPL-MCNC: 16 MG/DL (ref 6–20)
BUN/CREAT SERPL: 17 (ref 7–25)
CALCIUM SERPL-MCNC: 9.3 MG/DL (ref 8.6–10.5)
CHLORIDE SERPL-SCNC: 106 MMOL/L (ref 98–107)
CO2 SERPL-SCNC: 23.5 MMOL/L (ref 22–29)
CREAT SERPL-MCNC: 0.94 MG/DL (ref 0.57–1)
EOSINOPHIL # BLD AUTO: 0.09 10*3/MM3 (ref 0–0.4)
EOSINOPHIL NFR BLD AUTO: 1.3 % (ref 0.3–6.2)
ERYTHROCYTE [DISTWIDTH] IN BLOOD BY AUTOMATED COUNT: 12.4 % (ref 12.3–15.4)
FOLATE SERPL-MCNC: 6.13 NG/ML (ref 4.78–24.2)
GLOBULIN SER CALC-MCNC: 2.6 GM/DL
GLUCOSE SERPL-MCNC: 93 MG/DL (ref 65–99)
HCT VFR BLD AUTO: 38.5 % (ref 34–46.6)
HGB BLD-MCNC: 13.1 G/DL (ref 12–15.9)
IMM GRANULOCYTES # BLD AUTO: 0.02 10*3/MM3 (ref 0–0.05)
IMM GRANULOCYTES NFR BLD AUTO: 0.3 % (ref 0–0.5)
LYMPHOCYTES # BLD AUTO: 1.83 10*3/MM3 (ref 0.7–3.1)
LYMPHOCYTES NFR BLD AUTO: 27.2 % (ref 19.6–45.3)
MCH RBC QN AUTO: 29.6 PG (ref 26.6–33)
MCHC RBC AUTO-ENTMCNC: 34 G/DL (ref 31.5–35.7)
MCV RBC AUTO: 86.9 FL (ref 79–97)
MONOCYTES # BLD AUTO: 0.45 10*3/MM3 (ref 0.1–0.9)
MONOCYTES NFR BLD AUTO: 6.7 % (ref 5–12)
NEUTROPHILS # BLD AUTO: 4.3 10*3/MM3 (ref 1.7–7)
NEUTROPHILS NFR BLD AUTO: 63.9 % (ref 42.7–76)
NRBC BLD AUTO-RTO: 0 /100 WBC (ref 0–0.2)
PLATELET # BLD AUTO: 357 10*3/MM3 (ref 140–450)
POTASSIUM SERPL-SCNC: 4.4 MMOL/L (ref 3.5–5.2)
PROT SERPL-MCNC: 7 G/DL (ref 6–8.5)
RBC # BLD AUTO: 4.43 10*6/MM3 (ref 3.77–5.28)
SODIUM SERPL-SCNC: 139 MMOL/L (ref 136–145)
T4 FREE SERPL-MCNC: 0.98 NG/DL (ref 0.93–1.7)
TSH SERPL DL<=0.005 MIU/L-ACNC: 4.17 UIU/ML (ref 0.27–4.2)
VIT B12 SERPL-MCNC: 708 PG/ML (ref 211–946)
WBC # BLD AUTO: 6.73 10*3/MM3 (ref 3.4–10.8)

## 2021-02-12 RX ORDER — LEVOTHYROXINE SODIUM 0.03 MG/1
25 TABLET ORAL DAILY
Qty: 30 TABLET | Refills: 2 | Status: SHIPPED | OUTPATIENT
Start: 2021-02-12 | End: 2021-03-18

## 2021-02-17 ENCOUNTER — TELEPHONE (OUTPATIENT)
Dept: INTERNAL MEDICINE | Facility: CLINIC | Age: 31
End: 2021-02-17

## 2021-02-17 LAB — REF LAB TEST METHOD: NORMAL

## 2021-02-17 NOTE — TELEPHONE ENCOUNTER
Called Sriram at [facility].  Urine culture results are in patient chart.  [facility] must have been seeing a duplicate order.      Thanks, Ayaan

## 2021-02-17 NOTE — TELEPHONE ENCOUNTER
Caller: Sriram    Relationship to patient: Lab    Best call back number: [phone]  Patient is needing: Sriram called in from [facility] stating that they are unable to locate urine culture on patient and wanted to see if it had been canceled. Attempted to warm transfer, was unsuccessful. Please and advise.

## 2021-03-08 ENCOUNTER — OFFICE VISIT (OUTPATIENT)
Dept: INTERNAL MEDICINE | Facility: CLINIC | Age: 31
End: 2021-03-08

## 2021-03-08 VITALS
TEMPERATURE: 97.6 F | WEIGHT: 141 LBS | DIASTOLIC BLOOD PRESSURE: 80 MMHG | HEART RATE: 68 BPM | SYSTOLIC BLOOD PRESSURE: 100 MMHG | BODY MASS INDEX: 24.07 KG/M2 | HEIGHT: 64 IN | OXYGEN SATURATION: 99 %

## 2021-03-08 DIAGNOSIS — E55.9 VITAMIN D DEFICIENCY: ICD-10-CM

## 2021-03-08 DIAGNOSIS — Z3A.01 LESS THAN 8 WEEKS GESTATION OF PREGNANCY: ICD-10-CM

## 2021-03-08 DIAGNOSIS — Z00.00 ANNUAL PHYSICAL EXAM: Primary | ICD-10-CM

## 2021-03-08 DIAGNOSIS — E03.8 SUBCLINICAL HYPOTHYROIDISM: ICD-10-CM

## 2021-03-08 PROCEDURE — 99395 PREV VISIT EST AGE 18-39: CPT | Performed by: NURSE PRACTITIONER

## 2021-03-08 PROCEDURE — 99213 OFFICE O/P EST LOW 20 MIN: CPT | Performed by: NURSE PRACTITIONER

## 2021-03-08 RX ORDER — MELATONIN
1000 DAILY
COMMUNITY
End: 2021-06-14

## 2021-03-08 NOTE — PROGRESS NOTES
Subjective     Deepti Davies is a 30 y.o. female.         She presents for annual exam and follow-up for subclinical hypothyroidism. Pt presented 1 month ago for fatigue, constipation, dry skin and hair.  She was found to have normal TSH and T4 but more on the underactive side.  We decided to start low-dose levothyroxine 25 mcg daily to see if symptoms would improve.  Was also noted that her vitamin D was low and she also started over-the-counter vitamin D supplements.  Symptoms have improved.  Patient also reports that she is 6 months pregnant.  When she was seen a month ago we had suspicion she might be pregnant but the urine pregnancy test was negative.  A week later she did test positive for pregnancy. She should be about 6 weeks pregnant. Seeing GYN in 2 weeks. She eats a healthy diet and exercises 6 days a week. Dental and vision exams are UTD.      Hypothyroidism  Associated symptoms include fatigue. Pertinent negatives include no abdominal pain, chest pain, congestion, coughing, fever, headaches, nausea, rash, vomiting or weakness.        The following portions of the patient's history were reviewed and updated as appropriate: allergies, current medications, past social history and problem list.    Past Medical History:   Diagnosis Date   • Anxiety     after mother's death - overdose          Current Outpatient Medications:   •  cholecalciferol (VITAMIN D3) 25 MCG (1000 UT) tablet, Take 1,000 Units by mouth Daily., Disp: , Rfl:   •  levothyroxine (SYNTHROID, LEVOTHROID) 25 MCG tablet, Take 1 tablet by mouth Daily., Disp: 30 tablet, Rfl: 2  •  Prenatal Vit-Fe Fumarate-FA (PRENATAL VITAMIN AND MINERAL PO), Take  by mouth., Disp: , Rfl:     No Known Allergies    Review of Systems   Constitutional: Positive for fatigue. Negative for fever.   HENT: Negative for congestion, hearing loss and trouble swallowing.    Eyes: Negative for visual disturbance.   Respiratory: Negative for apnea, cough, chest tightness,  "shortness of breath and wheezing.    Cardiovascular: Negative for chest pain, palpitations and leg swelling.   Gastrointestinal: Positive for constipation. Negative for abdominal distention, abdominal pain, diarrhea, nausea and vomiting.   Endocrine: Negative for cold intolerance, heat intolerance, polydipsia, polyphagia and polyuria.   Genitourinary: Negative for difficulty urinating, dysuria, frequency and urgency.   Musculoskeletal: Negative for gait problem.   Skin: Negative for pallor and rash.   Neurological: Negative for speech difficulty, weakness and headaches.   Psychiatric/Behavioral: Negative for agitation, behavioral problems, confusion, dysphoric mood and sleep disturbance. The patient is not nervous/anxious.        Objective     /80   Pulse 68   Temp 97.6 °F (36.4 °C)   Ht 163.8 cm (64.49\")   Wt 64 kg (141 lb)   SpO2 99%   BMI 23.84 kg/m²   Wt Readings from Last 3 Encounters:   03/08/21 64 kg (141 lb)   02/11/21 65.3 kg (144 lb)   08/04/20 68 kg (150 lb)     Temp Readings from Last 3 Encounters:   03/08/21 97.6 °F (36.4 °C)   02/11/21 98.4 °F (36.9 °C)   03/03/20 97.7 °F (36.5 °C)     BP Readings from Last 3 Encounters:   03/08/21 100/80   02/11/21 114/68   08/04/20 104/62     Pulse Readings from Last 3 Encounters:   03/08/21 68   02/11/21 94   08/04/20 69       Physical Exam  Vitals reviewed.   Constitutional:       Appearance: She is well-developed.   HENT:      Head: Normocephalic and atraumatic.      Right Ear: Hearing and tympanic membrane normal.      Left Ear: Hearing and tympanic membrane normal.      Nose: Nose normal.      Mouth/Throat:      Pharynx: Uvula midline.      Tonsils: No tonsillar exudate.   Eyes:      General: Lids are normal.      Conjunctiva/sclera: Conjunctivae normal.      Pupils: Pupils are equal, round, and reactive to light.   Neck:      Thyroid: No thyromegaly.      Vascular: No carotid bruit.   Cardiovascular:      Rate and Rhythm: Normal rate and regular " rhythm.      Pulses:           Carotid pulses are 2+ on the right side and 2+ on the left side.       Dorsalis pedis pulses are 2+ on the right side and 2+ on the left side.      Heart sounds: Normal heart sounds. No murmur.   Pulmonary:      Effort: Pulmonary effort is normal. No respiratory distress.      Breath sounds: Normal breath sounds. No decreased breath sounds, wheezing or rhonchi.   Abdominal:      General: Bowel sounds are normal. There is no distension.      Palpations: Abdomen is soft.      Tenderness: There is no abdominal tenderness. There is no guarding or rebound.   Musculoskeletal:         General: No tenderness or deformity. Normal range of motion.      Cervical back: Normal range of motion.   Lymphadenopathy:      Head:      Right side of head: No submental, submandibular or tonsillar adenopathy.      Left side of head: No submental, submandibular or tonsillar adenopathy.   Skin:     General: Skin is warm and dry.   Neurological:      Mental Status: She is alert and oriented to person, place, and time.      Cranial Nerves: No cranial nerve deficit.      Sensory: No sensory deficit.      Coordination: Coordination normal.      Gait: Gait normal.      Deep Tendon Reflexes: Reflexes are normal and symmetric.   Psychiatric:         Attention and Perception: She is attentive.         Speech: Speech normal.         Behavior: Behavior normal.         Thought Content: Thought content normal.         Judgment: Judgment normal.         Assessment/Plan     Diagnoses and all orders for this visit:    1. Annual physical exam (Primary)  -     Lipid Panel    2. Less than 8 weeks gestation of pregnancy  Comments:  seeing GYN in 2 weeks, continue prenatal vitamin      3. Subclinical hypothyroidism  Comments:  symptoms improving, continue levothyroxine 25 mcg daily  Orders:  -     TSH  -     T4, Free    4. Vitamin D deficiency  Comments:  continue OTC Vit D    Encouraged healthy diet, regular exercise,  maintenance of healthy weight, good sleep habits, avoidance of tobacco/vaping products and excessive alcohol.    Return for worsening of sx.    Will return in 1 year for CPE.

## 2021-03-11 LAB
CHOLEST SERPL-MCNC: 163 MG/DL (ref 0–200)
HDLC SERPL-MCNC: 54 MG/DL (ref 40–60)
LDLC SERPL CALC-MCNC: 92 MG/DL (ref 0–100)
T4 FREE SERPL-MCNC: 1.2 NG/DL (ref 0.93–1.7)
TRIGL SERPL-MCNC: 89 MG/DL (ref 0–150)
TSH SERPL DL<=0.005 MIU/L-ACNC: 4.33 UIU/ML (ref 0.27–4.2)
VLDLC SERPL CALC-MCNC: 17 MG/DL (ref 5–40)

## 2021-03-18 ENCOUNTER — INITIAL PRENATAL (OUTPATIENT)
Dept: OBSTETRICS AND GYNECOLOGY | Age: 31
End: 2021-03-18

## 2021-03-18 VITALS — SYSTOLIC BLOOD PRESSURE: 114 MMHG | BODY MASS INDEX: 23.5 KG/M2 | WEIGHT: 139 LBS | DIASTOLIC BLOOD PRESSURE: 70 MMHG

## 2021-03-18 DIAGNOSIS — Z34.80 SUPERVISION OF OTHER NORMAL PREGNANCY, ANTEPARTUM: ICD-10-CM

## 2021-03-18 DIAGNOSIS — Z11.3 SCREEN FOR SEXUALLY TRANSMITTED DISEASES: ICD-10-CM

## 2021-03-18 DIAGNOSIS — E03.8 SUBCLINICAL HYPOTHYROIDISM: ICD-10-CM

## 2021-03-18 DIAGNOSIS — Z34.90 PREGNANCY, UNSPECIFIED GESTATIONAL AGE: ICD-10-CM

## 2021-03-18 DIAGNOSIS — Z13.89 SCREENING FOR BLOOD OR PROTEIN IN URINE: Primary | ICD-10-CM

## 2021-03-18 LAB
GLUCOSE UR STRIP-MCNC: NEGATIVE MG/DL
PROT UR STRIP-MCNC: NEGATIVE MG/DL
VZV IGG SER QL: NORMAL

## 2021-03-18 PROCEDURE — 0501F PRENATAL FLOW SHEET: CPT | Performed by: OBSTETRICS & GYNECOLOGY

## 2021-03-18 RX ORDER — LEVOTHYROXINE SODIUM 0.03 MG/1
50 TABLET ORAL DAILY
Qty: 30 TABLET | Refills: 11 | Status: SHIPPED | OUTPATIENT
Start: 2021-03-18 | End: 2021-04-19

## 2021-03-18 RX ORDER — LEVOTHYROXINE SODIUM 0.05 MG/1
50 TABLET ORAL DAILY
Qty: 30 TABLET | Refills: 11 | Status: SHIPPED | OUTPATIENT
Start: 2021-03-18 | End: 2022-02-07

## 2021-03-18 NOTE — PROGRESS NOTES
The patient is a 30-year-old  2 para 1-0-0-1 at 8 weeks by ultrasound in 8 weeks 4 days by LMP.  Patient does have long cycles that last about 35 days.  In February the patient went to primary care and was complaining of some dry skin, constipation and fatigue.  Her TSH was elevated above 4.  She was started on a low-dose of thyroid medication.  Repeat TSH was still above 4.  Patient feels like the medication has helped.  Her last TSH was just checked 1 week ago.  Patient complains of nausea.  She has not taken anything yet.  Patient has a history of gestational diabetes mellitus A1.  No genetic history.  Patient declines any genetic testing.    Exam-see exam tab  Ultrasound shows viable intrauterine gamble pregnancy.  Size different from dates by 4 days.    Assessment-8 weeks  We discussed genetic testing options-patient declines any genetic testing or carrier testing.  History of gestational diabetes mellitus A1-patient will follow diet parameters that we discussed in previous pregnancy  Subclinical hypothyroidism-patient's TSH was still elevated last week.  She will increase her Synthroid to 50 mcg and we will recheck at her next visit  New OB labs today  New OB information reviewed in detail  Discussed decreasing caffeine use  Follow-up in 4 weeks.

## 2021-03-19 ENCOUNTER — TELEPHONE (OUTPATIENT)
Dept: OBSTETRICS AND GYNECOLOGY | Age: 31
End: 2021-03-19

## 2021-03-19 LAB
ABO GROUP BLD: NORMAL
BASOPHILS # BLD AUTO: 0 X10E3/UL (ref 0–0.2)
BASOPHILS NFR BLD AUTO: 0 %
BLD GP AB SCN SERPL QL: NEGATIVE
C TRACH RRNA SPEC QL NAA+PROBE: NEGATIVE
EOSINOPHIL # BLD AUTO: 0 X10E3/UL (ref 0–0.4)
EOSINOPHIL NFR BLD AUTO: 0 %
ERYTHROCYTE [DISTWIDTH] IN BLOOD BY AUTOMATED COUNT: 12.2 % (ref 11.7–15.4)
HBV SURFACE AG SERPL QL IA: NEGATIVE
HCT VFR BLD AUTO: 38.6 % (ref 34–46.6)
HCV AB S/CO SERPL IA: <0.1 S/CO RATIO (ref 0–0.9)
HGB BLD-MCNC: 12.6 G/DL (ref 11.1–15.9)
HIV 1+2 AB+HIV1 P24 AG SERPL QL IA: NON REACTIVE
IMM GRANULOCYTES # BLD AUTO: 0 X10E3/UL (ref 0–0.1)
IMM GRANULOCYTES NFR BLD AUTO: 0 %
LYMPHOCYTES # BLD AUTO: 1 X10E3/UL (ref 0.7–3.1)
LYMPHOCYTES NFR BLD AUTO: 14 %
MCH RBC QN AUTO: 28.4 PG (ref 26.6–33)
MCHC RBC AUTO-ENTMCNC: 32.6 G/DL (ref 31.5–35.7)
MCV RBC AUTO: 87 FL (ref 79–97)
MONOCYTES # BLD AUTO: 0.4 X10E3/UL (ref 0.1–0.9)
MONOCYTES NFR BLD AUTO: 6 %
N GONORRHOEA RRNA SPEC QL NAA+PROBE: NEGATIVE
NEUTROPHILS # BLD AUTO: 5.5 X10E3/UL (ref 1.4–7)
NEUTROPHILS NFR BLD AUTO: 80 %
PLATELET # BLD AUTO: 333 X10E3/UL (ref 150–450)
RBC # BLD AUTO: 4.43 X10E6/UL (ref 3.77–5.28)
RH BLD: POSITIVE
RPR SER QL: NON REACTIVE
RUBV IGG SERPL IA-ACNC: 1.99 INDEX
WBC # BLD AUTO: 7 X10E3/UL (ref 3.4–10.8)

## 2021-03-19 NOTE — TELEPHONE ENCOUNTER
----- Message from Josh Marcelo MD sent at 3/19/2021  9:40 AM EDT -----  Please notify that pregnancy blood work is normal.

## 2021-03-20 LAB
BACTERIA UR CULT: NO GROWTH
BACTERIA UR CULT: NORMAL

## 2021-04-16 ENCOUNTER — BULK ORDERING (OUTPATIENT)
Dept: CASE MANAGEMENT | Facility: OTHER | Age: 31
End: 2021-04-16

## 2021-04-16 DIAGNOSIS — Z23 IMMUNIZATION DUE: ICD-10-CM

## 2021-04-19 ENCOUNTER — ROUTINE PRENATAL (OUTPATIENT)
Dept: OBSTETRICS AND GYNECOLOGY | Age: 31
End: 2021-04-19

## 2021-04-19 VITALS — SYSTOLIC BLOOD PRESSURE: 108 MMHG | BODY MASS INDEX: 23.5 KG/M2 | WEIGHT: 139 LBS | DIASTOLIC BLOOD PRESSURE: 74 MMHG

## 2021-04-19 DIAGNOSIS — Z34.90 PREGNANCY, UNSPECIFIED GESTATIONAL AGE: ICD-10-CM

## 2021-04-19 DIAGNOSIS — Z13.89 SCREENING FOR BLOOD OR PROTEIN IN URINE: Primary | ICD-10-CM

## 2021-04-19 DIAGNOSIS — E03.8 SUBCLINICAL HYPOTHYROIDISM: ICD-10-CM

## 2021-04-19 LAB
GLUCOSE UR STRIP-MCNC: NEGATIVE MG/DL
PROT UR STRIP-MCNC: NEGATIVE MG/DL

## 2021-04-19 PROCEDURE — 0502F SUBSEQUENT PRENATAL CARE: CPT | Performed by: OBSTETRICS & GYNECOLOGY

## 2021-04-19 NOTE — PROGRESS NOTES
The patient is feeling better.  She does not want to do any genetic testing.  She is taking her Synthroid.    New OB labs are reviewed.  Doppler tones are positive.  Blood pressure is normal.  No weight gain since last visit.    Assessment-12 weeks  Hypothyroidism-recheck TSH today.  Patient declines any genetic or carrier testing.  Follow-up in 4 weeks.

## 2021-04-20 ENCOUNTER — TELEPHONE (OUTPATIENT)
Dept: OBSTETRICS AND GYNECOLOGY | Age: 31
End: 2021-04-20

## 2021-04-20 LAB — TSH SERPL DL<=0.005 MIU/L-ACNC: 2.11 UIU/ML (ref 0.27–4.2)

## 2021-04-20 NOTE — TELEPHONE ENCOUNTER
----- Message from Josh Marcelo MD sent at 4/20/2021  8:30 AM EDT -----  Please notify TSH looks much better at 2.1.  Continue 50 mcg of Synthroid.

## 2021-05-17 ENCOUNTER — ROUTINE PRENATAL (OUTPATIENT)
Dept: OBSTETRICS AND GYNECOLOGY | Age: 31
End: 2021-05-17

## 2021-05-17 VITALS — SYSTOLIC BLOOD PRESSURE: 108 MMHG | WEIGHT: 141 LBS | BODY MASS INDEX: 23.84 KG/M2 | DIASTOLIC BLOOD PRESSURE: 74 MMHG

## 2021-05-17 DIAGNOSIS — Z13.89 SCREENING FOR BLOOD OR PROTEIN IN URINE: Primary | ICD-10-CM

## 2021-05-17 DIAGNOSIS — Z34.90 PREGNANCY, UNSPECIFIED GESTATIONAL AGE: ICD-10-CM

## 2021-05-17 LAB
GLUCOSE UR STRIP-MCNC: NEGATIVE MG/DL
PROT UR STRIP-MCNC: NEGATIVE MG/DL

## 2021-05-17 PROCEDURE — 0502F SUBSEQUENT PRENATAL CARE: CPT | Performed by: OBSTETRICS & GYNECOLOGY

## 2021-05-17 NOTE — PROGRESS NOTES
The patient has noticed that she does not have much of an appetite.  No nausea or reflux.  No fetal movements yet.  No vaginal bleeding.    Doppler tones are positive.  Weight gain for pregnancy is low  Blood pressure 108/74 with no protein.    Assessment-16 weeks  Decreased appetite.  Patient did gain 2 pounds.  Encourage frequent small meals and patient could also try Tums.  Hypothyroidism-TSH was normal at last visit.  Recheck at 20 weeks.  Anatomy ultrasound and patient will find out the gender in 4 weeks.  Patient declines any genetic or carrier testing.

## 2021-06-14 ENCOUNTER — ROUTINE PRENATAL (OUTPATIENT)
Dept: OBSTETRICS AND GYNECOLOGY | Age: 31
End: 2021-06-14

## 2021-06-14 VITALS — DIASTOLIC BLOOD PRESSURE: 72 MMHG | SYSTOLIC BLOOD PRESSURE: 108 MMHG | WEIGHT: 151 LBS | BODY MASS INDEX: 25.53 KG/M2

## 2021-06-14 DIAGNOSIS — Z34.90 PREGNANCY, UNSPECIFIED GESTATIONAL AGE: ICD-10-CM

## 2021-06-14 DIAGNOSIS — Z13.89 SCREENING FOR BLOOD OR PROTEIN IN URINE: Primary | ICD-10-CM

## 2021-06-14 DIAGNOSIS — E03.8 SUBCLINICAL HYPOTHYROIDISM: ICD-10-CM

## 2021-06-14 LAB
GLUCOSE UR STRIP-MCNC: NEGATIVE MG/DL
PROT UR STRIP-MCNC: NEGATIVE MG/DL

## 2021-06-14 PROCEDURE — 0502F SUBSEQUENT PRENATAL CARE: CPT | Performed by: OBSTETRICS & GYNECOLOGY

## 2021-06-14 NOTE — PROGRESS NOTES
Patient had return of her appetite.  She is feeling fetal movements.  She is here today for anatomy ultrasound.    Ultrasound shows normal anatomy within the limits of ultrasound except for suboptimal profile view.  Size is equal to dates.  Cervical length is normal at 4 with no funneling.  Placenta is anterior with no previa.  Baby is breech.  Baby is a girl.  Weight gain of 12 pounds  Blood pressure 108/72    Assessment-20 weeks  Weight gain is now normal  Repeat ultrasound in 4 weeks to try to get profile view  Hypothyroidism-check TSH today

## 2021-06-15 ENCOUNTER — TELEPHONE (OUTPATIENT)
Dept: OBSTETRICS AND GYNECOLOGY | Age: 31
End: 2021-06-15

## 2021-06-15 LAB — TSH SERPL DL<=0.005 MIU/L-ACNC: 2.33 UIU/ML (ref 0.45–4.5)

## 2021-06-15 NOTE — TELEPHONE ENCOUNTER
----- Message from Josh Marcelo MD sent at 6/15/2021  8:49 AM EDT -----  Please notify blood work is normal.

## 2021-07-13 ENCOUNTER — ROUTINE PRENATAL (OUTPATIENT)
Dept: OBSTETRICS AND GYNECOLOGY | Age: 31
End: 2021-07-13

## 2021-07-13 VITALS — WEIGHT: 157.4 LBS | BODY MASS INDEX: 26.61 KG/M2 | SYSTOLIC BLOOD PRESSURE: 106 MMHG | DIASTOLIC BLOOD PRESSURE: 52 MMHG

## 2021-07-13 DIAGNOSIS — E03.8 SUBCLINICAL HYPOTHYROIDISM: ICD-10-CM

## 2021-07-13 DIAGNOSIS — Z34.90 PREGNANCY, UNSPECIFIED GESTATIONAL AGE: Primary | ICD-10-CM

## 2021-07-13 PROCEDURE — 59425 ANTEPARTUM CARE ONLY: CPT | Performed by: OBSTETRICS & GYNECOLOGY

## 2021-07-13 NOTE — PROGRESS NOTES
The patient is feeling some fatigue.  She was planning on getting her Covid vaccination.  Good fetal movements.    Doppler heart tones are positive.  Fundal height is appropriate  Blood pressure 106/52.  Weight gain is just slightly high for pregnancy.    Assessment-24 weeks  Third trimester labs and recheck TSH at next visit  Fetal profile not seen well on anatomy ultrasound-we will recheck at 32-week ultrasound  Pediatrician recorded today  Discussed Covid vaccination.  Patient plans to get soon.

## 2021-08-12 ENCOUNTER — ROUTINE PRENATAL (OUTPATIENT)
Dept: OBSTETRICS AND GYNECOLOGY | Age: 31
End: 2021-08-12

## 2021-08-12 VITALS — BODY MASS INDEX: 27.73 KG/M2 | SYSTOLIC BLOOD PRESSURE: 112 MMHG | WEIGHT: 164 LBS | DIASTOLIC BLOOD PRESSURE: 64 MMHG

## 2021-08-12 DIAGNOSIS — Z3A.29 29 WEEKS GESTATION OF PREGNANCY: ICD-10-CM

## 2021-08-12 DIAGNOSIS — Z23 NEED FOR TDAP VACCINATION: ICD-10-CM

## 2021-08-12 DIAGNOSIS — Z13.89 SCREENING FOR BLOOD OR PROTEIN IN URINE: Primary | ICD-10-CM

## 2021-08-12 DIAGNOSIS — E03.8 SUBCLINICAL HYPOTHYROIDISM: ICD-10-CM

## 2021-08-12 DIAGNOSIS — Z13.1 SCREENING FOR DIABETES MELLITUS: ICD-10-CM

## 2021-08-12 LAB
GLUCOSE UR STRIP-MCNC: NEGATIVE MG/DL
PROT UR STRIP-MCNC: NEGATIVE MG/DL

## 2021-08-12 PROCEDURE — 0502F SUBSEQUENT PRENATAL CARE: CPT | Performed by: PHYSICIAN ASSISTANT

## 2021-08-12 PROCEDURE — 90715 TDAP VACCINE 7 YRS/> IM: CPT | Performed by: PHYSICIAN ASSISTANT

## 2021-08-12 PROCEDURE — 90471 IMMUNIZATION ADMIN: CPT | Performed by: PHYSICIAN ASSISTANT

## 2021-08-12 NOTE — PROGRESS NOTES
Chief Complaint   Patient presents with   • Routine Prenatal Visit     one hour gtt       HPI: 30 y.o.  at 29w0d gestation  She is doing well  Has been feeling good FM  She is having a girl  Will do 1 hour gtt, cbc and check tsh levels today  Also receive Tdap today  Pt was GDM with last pregnancy  Has been working on diet changes and exercise  TWG is 25 lbs, up 7 lbs from last visit  BP wnl and neg urine      Vitals:    21 1008   BP: 112/64   Weight: 74.4 kg (164 lb)       ROS:  GI:  Negative  : na  Pulmonary: Negative     A/P  1. Intrauterine pregnancy at 29w0d   2. Pregnancy Risk:  NORMAL    Diagnoses and all orders for this visit:    1. Screening for blood or protein in urine (Primary)  -     POC Urinalysis Dipstick, Multipro    2. 29 weeks gestation of pregnancy  -     POC Urinalysis Dipstick, Multipro  -     Cancel: Gestational Screen 1 Hr ([facility])  -     Cancel: CBC & Differential  -     CBC & Differential    3. Screening for diabetes mellitus  -     Cancel: Gestational Screen 1 Hr ([facility])  -     Cancel: CBC & Differential  -     Gestational Screen 1 Hr ([facility])    4. Subclinical hypothyroidism  -     TSH    5. Need for Tdap vaccination    Other orders  -     Tdap Vaccine Greater Than or Equal To 8yo IM        -----------------------  PLAN:   Return in about 2 weeks (around 2021) for as scheduled.      DIANA Brown  2021 11:34 EDT

## 2021-08-13 LAB
BASOPHILS # BLD AUTO: 0.04 10*3/MM3 (ref 0–0.2)
BASOPHILS NFR BLD AUTO: 0.4 % (ref 0–1.5)
EOSINOPHIL # BLD AUTO: 0.15 10*3/MM3 (ref 0–0.4)
EOSINOPHIL NFR BLD AUTO: 1.6 % (ref 0.3–6.2)
ERYTHROCYTE [DISTWIDTH] IN BLOOD BY AUTOMATED COUNT: 12.4 % (ref 12.3–15.4)
GLUCOSE 1H P 50 G GLC PO SERPL-MCNC: 88 MG/DL (ref 65–139)
HCT VFR BLD AUTO: 35.8 % (ref 34–46.6)
HGB BLD-MCNC: 11.8 G/DL (ref 12–15.9)
IMM GRANULOCYTES # BLD AUTO: 0.05 10*3/MM3 (ref 0–0.05)
IMM GRANULOCYTES NFR BLD AUTO: 0.5 % (ref 0–0.5)
LYMPHOCYTES # BLD AUTO: 1.98 10*3/MM3 (ref 0.7–3.1)
LYMPHOCYTES NFR BLD AUTO: 21.2 % (ref 19.6–45.3)
MCH RBC QN AUTO: 28.9 PG (ref 26.6–33)
MCHC RBC AUTO-ENTMCNC: 33 G/DL (ref 31.5–35.7)
MCV RBC AUTO: 87.7 FL (ref 79–97)
MONOCYTES # BLD AUTO: 0.56 10*3/MM3 (ref 0.1–0.9)
MONOCYTES NFR BLD AUTO: 6 % (ref 5–12)
NEUTROPHILS # BLD AUTO: 6.58 10*3/MM3 (ref 1.7–7)
NEUTROPHILS NFR BLD AUTO: 70.3 % (ref 42.7–76)
NRBC BLD AUTO-RTO: 0 /100 WBC (ref 0–0.2)
PLATELET # BLD AUTO: 324 10*3/MM3 (ref 140–450)
RBC # BLD AUTO: 4.08 10*6/MM3 (ref 3.77–5.28)
TSH SERPL DL<=0.005 MIU/L-ACNC: 2.27 UIU/ML (ref 0.27–4.2)
WBC # BLD AUTO: 9.36 10*3/MM3 (ref 3.4–10.8)

## 2021-08-26 ENCOUNTER — ROUTINE PRENATAL (OUTPATIENT)
Dept: OBSTETRICS AND GYNECOLOGY | Age: 31
End: 2021-08-26

## 2021-08-26 VITALS — SYSTOLIC BLOOD PRESSURE: 108 MMHG | BODY MASS INDEX: 27.9 KG/M2 | WEIGHT: 165 LBS | DIASTOLIC BLOOD PRESSURE: 74 MMHG

## 2021-08-26 DIAGNOSIS — Z34.90 PREGNANCY, UNSPECIFIED GESTATIONAL AGE: ICD-10-CM

## 2021-08-26 DIAGNOSIS — Z13.89 SCREENING FOR BLOOD OR PROTEIN IN URINE: Primary | ICD-10-CM

## 2021-08-26 DIAGNOSIS — E03.8 SUBCLINICAL HYPOTHYROIDISM: ICD-10-CM

## 2021-08-26 LAB
GLUCOSE UR STRIP-MCNC: NEGATIVE MG/DL
PROT UR STRIP-MCNC: NEGATIVE MG/DL

## 2021-08-26 PROCEDURE — 99214 OFFICE O/P EST MOD 30 MIN: CPT | Performed by: OBSTETRICS & GYNECOLOGY

## 2021-08-26 NOTE — PROGRESS NOTES
The patient passed her diabetes screen.  Hemoglobin was normal at 11.8.  She was told to start on some iron but is having some constipation.  She is getting her Covid vaccination tomorrow.  She was told to delay since she had her Tdap within 2 weeks.  TSH was normal.    Doppler tones are positive fundal height is appropriate  Blood pressure 108/74 with no protein  Weight gain for pregnancy is slightly high 26 pounds.    Assessment-31 weeks  Ultrasound to check fetal profile in 2 weeks  Hypothyroidism-TSH was normal.  Recheck at about 35 weeks.  Patient is going to get her Covid vaccination tomorrow  Hemoglobin is normal at 11.8.  Patient can stop iron.

## 2021-09-09 ENCOUNTER — ROUTINE PRENATAL (OUTPATIENT)
Dept: OBSTETRICS AND GYNECOLOGY | Age: 31
End: 2021-09-09

## 2021-09-09 VITALS — DIASTOLIC BLOOD PRESSURE: 74 MMHG | SYSTOLIC BLOOD PRESSURE: 108 MMHG | WEIGHT: 168 LBS | BODY MASS INDEX: 28.4 KG/M2

## 2021-09-09 DIAGNOSIS — Z13.89 SCREENING FOR BLOOD OR PROTEIN IN URINE: Primary | ICD-10-CM

## 2021-09-09 DIAGNOSIS — Z3A.32 32 WEEKS GESTATION OF PREGNANCY: ICD-10-CM

## 2021-09-09 DIAGNOSIS — E03.8 SUBCLINICAL HYPOTHYROIDISM: ICD-10-CM

## 2021-09-09 LAB
GLUCOSE UR STRIP-MCNC: NEGATIVE MG/DL
PROT UR STRIP-MCNC: NEGATIVE MG/DL

## 2021-09-09 PROCEDURE — 99213 OFFICE O/P EST LOW 20 MIN: CPT | Performed by: OBSTETRICS & GYNECOLOGY

## 2021-09-09 NOTE — PROGRESS NOTES
Patient has started her Covid vaccination series.  Baby is moving well.  She has no complaints.    Ultrasound shows estimated fetal weight of 4 pounds 9 ounces at the 43rd percentile.  Abdominal circumference is at the 61st percentile.  Baby is vertex and ARABELLA is normal at 11.  Weight gain for pregnancy is just slightly high  Blood pressure 108/74 with no protein.    Assessment-33 weeks  Fetal profile was seen on ultrasound today and estimated fetal weight is normal.  Hypothyroidism-recheck TSH at 35 weeks  Patient is started her Covid vaccination series.

## 2021-09-23 ENCOUNTER — ROUTINE PRENATAL (OUTPATIENT)
Dept: OBSTETRICS AND GYNECOLOGY | Age: 31
End: 2021-09-23

## 2021-09-23 VITALS — BODY MASS INDEX: 28.57 KG/M2 | SYSTOLIC BLOOD PRESSURE: 108 MMHG | DIASTOLIC BLOOD PRESSURE: 74 MMHG | WEIGHT: 169 LBS

## 2021-09-23 DIAGNOSIS — E03.8 SUBCLINICAL HYPOTHYROIDISM: ICD-10-CM

## 2021-09-23 DIAGNOSIS — Z34.90 PREGNANCY, UNSPECIFIED GESTATIONAL AGE: ICD-10-CM

## 2021-09-23 DIAGNOSIS — Z13.89 SCREENING FOR BLOOD OR PROTEIN IN URINE: Primary | ICD-10-CM

## 2021-09-23 LAB
GLUCOSE UR STRIP-MCNC: NEGATIVE MG/DL
PROT UR STRIP-MCNC: NEGATIVE MG/DL

## 2021-09-23 PROCEDURE — 90686 IIV4 VACC NO PRSV 0.5 ML IM: CPT | Performed by: OBSTETRICS & GYNECOLOGY

## 2021-09-23 PROCEDURE — 90471 IMMUNIZATION ADMIN: CPT | Performed by: OBSTETRICS & GYNECOLOGY

## 2021-09-23 PROCEDURE — 99213 OFFICE O/P EST LOW 20 MIN: CPT | Performed by: OBSTETRICS & GYNECOLOGY

## 2021-09-23 NOTE — PROGRESS NOTES
The patient noticed a red spot on her left breast that she would like checked.  Baby is moving well.  She has completed her Covid vaccination series and would like to get her flu vaccine today.    Doppler heart tones are positive and fundal height is appropriate  Weight gain for pregnancy is high normal  Blood pressure 108/74 with no protein.  Exam of the patient's left breast shows a small red spot that is nonraised.  Appears to be related to pregnancy.-Nonworrisome    Assessment-35 weeks  Hypothyroidism-check TSH today  Recommend kick counts and follow-up in 2 weeks  Flu vaccination today

## 2021-09-24 LAB — TSH SERPL DL<=0.005 MIU/L-ACNC: 2.11 UIU/ML (ref 0.27–4.2)

## 2021-10-07 ENCOUNTER — ROUTINE PRENATAL (OUTPATIENT)
Dept: OBSTETRICS AND GYNECOLOGY | Age: 31
End: 2021-10-07

## 2021-10-07 VITALS — WEIGHT: 175.2 LBS | SYSTOLIC BLOOD PRESSURE: 110 MMHG | BODY MASS INDEX: 29.62 KG/M2 | DIASTOLIC BLOOD PRESSURE: 70 MMHG

## 2021-10-07 DIAGNOSIS — Z34.90 PREGNANCY, UNSPECIFIED GESTATIONAL AGE: ICD-10-CM

## 2021-10-07 DIAGNOSIS — E03.8 SUBCLINICAL HYPOTHYROIDISM: ICD-10-CM

## 2021-10-07 DIAGNOSIS — Z13.89 SCREENING FOR BLOOD OR PROTEIN IN URINE: Primary | ICD-10-CM

## 2021-10-07 DIAGNOSIS — Z36.85 ANTENATAL SCREENING FOR STREPTOCOCCUS B: ICD-10-CM

## 2021-10-07 LAB
GLUCOSE UR STRIP-MCNC: NEGATIVE MG/DL
PROT UR STRIP-MCNC: NEGATIVE MG/DL

## 2021-10-07 PROCEDURE — 99213 OFFICE O/P EST LOW 20 MIN: CPT | Performed by: OBSTETRICS & GYNECOLOGY

## 2021-10-07 NOTE — PROGRESS NOTES
Is feeling well.  Baby is moving actively.  She has had some [place] Pace contractions.    Cervix is 1 cm 50% of mid position.  Moderate consistency  TSH was normal at 2.1  Doppler heart tones are positive and fundal height is appropriate  Blood pressure 110/70 with no protein.    Assessment-37 weeks  Hypothyroidism-continue Synthroid.  We discussed that the patient could decrease her dose after delivery or we could check at 6 weeks postpartum.  Recommend kick counts  Follow-up weekly.

## 2021-10-09 LAB — GP B STREP DNA SPEC QL NAA+PROBE: NEGATIVE

## 2021-10-14 ENCOUNTER — ROUTINE PRENATAL (OUTPATIENT)
Dept: OBSTETRICS AND GYNECOLOGY | Age: 31
End: 2021-10-14

## 2021-10-14 VITALS — DIASTOLIC BLOOD PRESSURE: 66 MMHG | BODY MASS INDEX: 29.59 KG/M2 | SYSTOLIC BLOOD PRESSURE: 108 MMHG | WEIGHT: 175 LBS

## 2021-10-14 DIAGNOSIS — Z13.89 SCREENING FOR BLOOD OR PROTEIN IN URINE: Primary | ICD-10-CM

## 2021-10-14 LAB
GLUCOSE UR STRIP-MCNC: NEGATIVE MG/DL
PROT UR STRIP-MCNC: NEGATIVE MG/DL

## 2021-10-14 PROCEDURE — 99213 OFFICE O/P EST LOW 20 MIN: CPT | Performed by: OBSTETRICS & GYNECOLOGY

## 2021-10-14 NOTE — PROGRESS NOTES
The patient is feeling good fetal movements.    Cervix is 1 to 2 cm 50% and mid position.  Doppler tones are positive and fundal height is appropriate  Blood pressure 108/66 with no protein.    Assessment-38 weeks  Discussed possible induction of labor on October 26 or October 23.  Patient will consider and let me know at her next visit  Hypothyroidism-continue Synthroid.  TSH was normal.  Recommend kick counts.

## 2021-10-21 ENCOUNTER — ROUTINE PRENATAL (OUTPATIENT)
Dept: OBSTETRICS AND GYNECOLOGY | Age: 31
End: 2021-10-21

## 2021-10-21 VITALS — WEIGHT: 177.6 LBS | BODY MASS INDEX: 30.03 KG/M2 | DIASTOLIC BLOOD PRESSURE: 66 MMHG | SYSTOLIC BLOOD PRESSURE: 110 MMHG

## 2021-10-21 DIAGNOSIS — Z3A.39 39 WEEKS GESTATION OF PREGNANCY: Primary | ICD-10-CM

## 2021-10-21 DIAGNOSIS — Z34.90 PREGNANCY, UNSPECIFIED GESTATIONAL AGE: ICD-10-CM

## 2021-10-21 LAB
GLUCOSE UR STRIP-MCNC: NEGATIVE MG/DL
PROT UR STRIP-MCNC: NEGATIVE MG/DL

## 2021-10-21 PROCEDURE — 99213 OFFICE O/P EST LOW 20 MIN: CPT | Performed by: OBSTETRICS & GYNECOLOGY

## 2021-10-21 NOTE — PROGRESS NOTES
Patient is feeling good fetal movements.  She is thinking that she does not want to do an induction.    Cervix is 2 cm 50% and mid position.  Membrane sweep is done  GBS is negative  Blood pressure 110/66 with no protein  Doppler tones are positive and fundal height is appropriate    Assessment-39 weeks  Discussed the risk and benefits of induction of labor.  Patient does not want to do induction at this point.  She will follow up with me in 1 week.  Recommend kick counts  Hypothyroidism-continue Synthroid.

## 2021-10-28 ENCOUNTER — ROUTINE PRENATAL (OUTPATIENT)
Dept: OBSTETRICS AND GYNECOLOGY | Age: 31
End: 2021-10-28

## 2021-10-28 VITALS — WEIGHT: 178 LBS | SYSTOLIC BLOOD PRESSURE: 112 MMHG | DIASTOLIC BLOOD PRESSURE: 74 MMHG | BODY MASS INDEX: 30.09 KG/M2

## 2021-10-28 DIAGNOSIS — E03.8 SUBCLINICAL HYPOTHYROIDISM: ICD-10-CM

## 2021-10-28 DIAGNOSIS — Z34.90 PREGNANCY, UNSPECIFIED GESTATIONAL AGE: ICD-10-CM

## 2021-10-28 DIAGNOSIS — Z13.89 SCREENING FOR BLOOD OR PROTEIN IN URINE: Primary | ICD-10-CM

## 2021-10-28 LAB
GLUCOSE UR STRIP-MCNC: NEGATIVE MG/DL
PROT UR STRIP-MCNC: NEGATIVE MG/DL

## 2021-10-28 PROCEDURE — 99213 OFFICE O/P EST LOW 20 MIN: CPT | Performed by: OBSTETRICS & GYNECOLOGY

## 2021-10-28 NOTE — PROGRESS NOTES
The patient is feeling good fetal movements.  She did have cramping after the membrane sweep.    Cervix is 2 to 3 cm 70% and mid position.  Membrane sweep is done.  GBS is negative  Blood pressure 112/74 with no protein  Doppler heart tones are positive and fundal height is appropriate.  Baby is vertex.    Assessment-40 weeks  Patient does not want to do an induction at this point but would like to be placed on the schedule early next week.  I will plan for induction Tuesday.  Recommend kick counts and labor warnings given.  Hypothyroidism-continue Synthroid.

## 2021-11-02 ENCOUNTER — ANESTHESIA EVENT (OUTPATIENT)
Dept: LABOR AND DELIVERY | Facility: HOSPITAL | Age: 31
End: 2021-11-02

## 2021-11-02 ENCOUNTER — HOSPITAL ENCOUNTER (INPATIENT)
Facility: HOSPITAL | Age: 31
LOS: 2 days | Discharge: HOME | End: 2021-11-04
Attending: OBSTETRICS & GYNECOLOGY | Admitting: OBSTETRICS & GYNECOLOGY

## 2021-11-02 ENCOUNTER — HOSPITAL ENCOUNTER (OUTPATIENT)
Dept: LABOR AND DELIVERY | Facility: HOSPITAL | Age: 31
Discharge: HOME | End: 2021-11-02

## 2021-11-02 ENCOUNTER — ANESTHESIA (OUTPATIENT)
Dept: LABOR AND DELIVERY | Facility: HOSPITAL | Age: 31
End: 2021-11-02

## 2021-11-02 PROBLEM — Z34.90 PREGNANT: Status: ACTIVE | Noted: 2021-11-02

## 2021-11-02 LAB
ABO GROUP BLD: NORMAL
ALBUMIN SERPL-MCNC: 3.6 G/DL (ref 3.5–5.2)
ALBUMIN/GLOB SERPL: 1.1 G/DL
ALP SERPL-CCNC: 142 U/L (ref 39–117)
ALT SERPL W P-5'-P-CCNC: 11 U/L (ref 1–33)
ANION GAP SERPL CALCULATED.3IONS-SCNC: 14.8 MMOL/L (ref 5–15)
AST SERPL-CCNC: 16 U/L (ref 1–32)
BILIRUB SERPL-MCNC: <0.2 MG/DL (ref 0–1.2)
BLD GP AB SCN SERPL QL: NEGATIVE
BUN SERPL-MCNC: 10 MG/DL (ref 6–20)
BUN/CREAT SERPL: 17.2 (ref 7–25)
CALCIUM SPEC-SCNC: 8.6 MG/DL (ref 8.6–10.5)
CHLORIDE SERPL-SCNC: 105 MMOL/L (ref 98–107)
CO2 SERPL-SCNC: 16.2 MMOL/L (ref 22–29)
CREAT SERPL-MCNC: 0.58 MG/DL (ref 0.57–1)
DEPRECATED RDW RBC AUTO: 41.4 FL (ref 37–54)
ERYTHROCYTE [DISTWIDTH] IN BLOOD BY AUTOMATED COUNT: 13.1 % (ref 12.3–15.4)
GFR SERPL CREATININE-BSD FRML MDRD: 124 ML/MIN/1.73
GLOBULIN UR ELPH-MCNC: 3.3 GM/DL
GLUCOSE SERPL-MCNC: 103 MG/DL (ref 65–99)
HCT VFR BLD AUTO: 38.8 % (ref 34–46.6)
HGB BLD-MCNC: 13.1 G/DL (ref 12–15.9)
MCH RBC QN AUTO: 29.2 PG (ref 26.6–33)
MCHC RBC AUTO-ENTMCNC: 33.8 G/DL (ref 31.5–35.7)
MCV RBC AUTO: 86.6 FL (ref 79–97)
PLATELET # BLD AUTO: 285 10*3/MM3 (ref 140–450)
PMV BLD AUTO: 11.2 FL (ref 6–12)
POTASSIUM SERPL-SCNC: 3.8 MMOL/L (ref 3.5–5.2)
PROT SERPL-MCNC: 6.9 G/DL (ref 6–8.5)
RBC # BLD AUTO: 4.48 10*6/MM3 (ref 3.77–5.28)
RH BLD: POSITIVE
SARS-COV-2 RNA PNL SPEC NAA+PROBE: NOT DETECTED
SODIUM SERPL-SCNC: 136 MMOL/L (ref 136–145)
T&S EXPIRATION DATE: NORMAL
WBC # BLD AUTO: 9.64 10*3/MM3 (ref 3.4–10.8)

## 2021-11-02 PROCEDURE — 59410 OBSTETRICAL CARE: CPT | Performed by: OBSTETRICS & GYNECOLOGY

## 2021-11-02 PROCEDURE — C1755 CATHETER, INTRASPINAL: HCPCS

## 2021-11-02 PROCEDURE — S0260 H&P FOR SURGERY: HCPCS | Performed by: OBSTETRICS & GYNECOLOGY

## 2021-11-02 PROCEDURE — 85027 COMPLETE CBC AUTOMATED: CPT | Performed by: OBSTETRICS & GYNECOLOGY

## 2021-11-02 PROCEDURE — 10907ZC DRAINAGE OF AMNIOTIC FLUID, THERAPEUTIC FROM PRODUCTS OF CONCEPTION, VIA NATURAL OR ARTIFICIAL OPENING: ICD-10-PCS | Performed by: OBSTETRICS & GYNECOLOGY

## 2021-11-02 PROCEDURE — C1755 CATHETER, INTRASPINAL: HCPCS | Performed by: ANESTHESIOLOGY

## 2021-11-02 PROCEDURE — 25010000002 ONDANSETRON PER 1 MG: Performed by: OBSTETRICS & GYNECOLOGY

## 2021-11-02 PROCEDURE — 86901 BLOOD TYPING SEROLOGIC RH(D): CPT | Performed by: OBSTETRICS & GYNECOLOGY

## 2021-11-02 PROCEDURE — 25010000002 ROPIVACAINE PER 1 MG: Performed by: ANESTHESIOLOGY

## 2021-11-02 PROCEDURE — 0KQM0ZZ REPAIR PERINEUM MUSCLE, OPEN APPROACH: ICD-10-PCS | Performed by: OBSTETRICS & GYNECOLOGY

## 2021-11-02 PROCEDURE — 25010000002 FENTANYL CITRATE (PF) 50 MCG/ML SOLUTION: Performed by: ANESTHESIOLOGY

## 2021-11-02 PROCEDURE — 86850 RBC ANTIBODY SCREEN: CPT | Performed by: OBSTETRICS & GYNECOLOGY

## 2021-11-02 PROCEDURE — 87635 SARS-COV-2 COVID-19 AMP PRB: CPT | Performed by: OBSTETRICS & GYNECOLOGY

## 2021-11-02 PROCEDURE — 80053 COMPREHEN METABOLIC PANEL: CPT | Performed by: OBSTETRICS & GYNECOLOGY

## 2021-11-02 PROCEDURE — 86900 BLOOD TYPING SEROLOGIC ABO: CPT | Performed by: OBSTETRICS & GYNECOLOGY

## 2021-11-02 RX ORDER — OXYTOCIN-SODIUM CHLORIDE 0.9% IV SOLN 30 UNIT/500ML 30-0.9/5 UT/ML-%
2-20 SOLUTION INTRAVENOUS
Status: DISCONTINUED | OUTPATIENT
Start: 2021-11-02 | End: 2021-11-02

## 2021-11-02 RX ORDER — PHYTONADIONE 1 MG/.5ML
INJECTION, EMULSION INTRAMUSCULAR; INTRAVENOUS; SUBCUTANEOUS
Status: ACTIVE
Start: 2021-11-02 | End: 2021-11-03

## 2021-11-02 RX ORDER — DIPHENHYDRAMINE HYDROCHLORIDE 50 MG/ML
12.5 INJECTION INTRAMUSCULAR; INTRAVENOUS EVERY 8 HOURS PRN
Status: DISCONTINUED | OUTPATIENT
Start: 2021-11-02 | End: 2021-11-02

## 2021-11-02 RX ORDER — FENTANYL CITRATE 50 UG/ML
INJECTION, SOLUTION INTRAMUSCULAR; INTRAVENOUS
Status: COMPLETED
Start: 2021-11-02 | End: 2021-11-02

## 2021-11-02 RX ORDER — OXYTOCIN-SODIUM CHLORIDE 0.9% IV SOLN 30 UNIT/500ML 30-0.9/5 UT/ML-%
250 SOLUTION INTRAVENOUS CONTINUOUS PRN
Status: ACTIVE | OUTPATIENT
Start: 2021-11-02 | End: 2021-11-02

## 2021-11-02 RX ORDER — FAMOTIDINE 10 MG/ML
20 INJECTION, SOLUTION INTRAVENOUS EVERY 12 HOURS PRN
Status: DISCONTINUED | OUTPATIENT
Start: 2021-11-02 | End: 2021-11-02

## 2021-11-02 RX ORDER — OXYCODONE HYDROCHLORIDE AND ACETAMINOPHEN 5; 325 MG/1; MG/1
2 TABLET ORAL EVERY 4 HOURS PRN
Status: DISCONTINUED | OUTPATIENT
Start: 2021-11-02 | End: 2021-11-04 | Stop reason: HOSPADM

## 2021-11-02 RX ORDER — OXYTOCIN-SODIUM CHLORIDE 0.9% IV SOLN 30 UNIT/500ML 30-0.9/5 UT/ML-%
999 SOLUTION INTRAVENOUS ONCE
Status: COMPLETED | OUTPATIENT
Start: 2021-11-02 | End: 2021-11-02

## 2021-11-02 RX ORDER — ROPIVACAINE HYDROCHLORIDE 2 MG/ML
INJECTION, SOLUTION EPIDURAL; INFILTRATION; PERINEURAL AS NEEDED
Status: DISCONTINUED | OUTPATIENT
Start: 2021-11-02 | End: 2021-11-02 | Stop reason: SURG

## 2021-11-02 RX ORDER — HYDROCORTISONE 25 MG/G
1 CREAM TOPICAL AS NEEDED
Status: DISCONTINUED | OUTPATIENT
Start: 2021-11-02 | End: 2021-11-04 | Stop reason: HOSPADM

## 2021-11-02 RX ORDER — ERYTHROMYCIN 5 MG/G
OINTMENT OPHTHALMIC
Status: ACTIVE
Start: 2021-11-02 | End: 2021-11-03

## 2021-11-02 RX ORDER — ONDANSETRON 2 MG/ML
4 INJECTION INTRAMUSCULAR; INTRAVENOUS ONCE AS NEEDED
Status: DISCONTINUED | OUTPATIENT
Start: 2021-11-02 | End: 2021-11-02

## 2021-11-02 RX ORDER — FAMOTIDINE 20 MG/1
20 TABLET, FILM COATED ORAL EVERY 12 HOURS PRN
Status: DISCONTINUED | OUTPATIENT
Start: 2021-11-02 | End: 2021-11-02

## 2021-11-02 RX ORDER — LEVOTHYROXINE SODIUM 0.05 MG/1
50 TABLET ORAL
Status: DISCONTINUED | OUTPATIENT
Start: 2021-11-03 | End: 2021-11-02

## 2021-11-02 RX ORDER — MISOPROSTOL 200 UG/1
800 TABLET ORAL ONCE AS NEEDED
Status: DISCONTINUED | OUTPATIENT
Start: 2021-11-02 | End: 2021-11-02

## 2021-11-02 RX ORDER — OXYCODONE HYDROCHLORIDE AND ACETAMINOPHEN 5; 325 MG/1; MG/1
1 TABLET ORAL EVERY 4 HOURS PRN
Status: DISCONTINUED | OUTPATIENT
Start: 2021-11-02 | End: 2021-11-04 | Stop reason: HOSPADM

## 2021-11-02 RX ORDER — EPHEDRINE SULFATE 50 MG/ML
5 INJECTION, SOLUTION INTRAVENOUS AS NEEDED
Status: DISCONTINUED | OUTPATIENT
Start: 2021-11-02 | End: 2021-11-02

## 2021-11-02 RX ORDER — LEVOTHYROXINE SODIUM 0.05 MG/1
50 TABLET ORAL
Status: DISCONTINUED | OUTPATIENT
Start: 2021-11-02 | End: 2021-11-04 | Stop reason: HOSPADM

## 2021-11-02 RX ORDER — FAMOTIDINE 10 MG/ML
20 INJECTION, SOLUTION INTRAVENOUS ONCE AS NEEDED
Status: COMPLETED | OUTPATIENT
Start: 2021-11-02 | End: 2021-11-02

## 2021-11-02 RX ORDER — DIPHENHYDRAMINE HCL 25 MG
25 CAPSULE ORAL NIGHTLY PRN
Status: DISCONTINUED | OUTPATIENT
Start: 2021-11-02 | End: 2021-11-04 | Stop reason: HOSPADM

## 2021-11-02 RX ORDER — ONDANSETRON 2 MG/ML
4 INJECTION INTRAMUSCULAR; INTRAVENOUS EVERY 6 HOURS PRN
Status: DISCONTINUED | OUTPATIENT
Start: 2021-11-02 | End: 2021-11-04 | Stop reason: HOSPADM

## 2021-11-02 RX ORDER — FENTANYL CITRATE 50 UG/ML
INJECTION, SOLUTION INTRAMUSCULAR; INTRAVENOUS AS NEEDED
Status: DISCONTINUED | OUTPATIENT
Start: 2021-11-02 | End: 2021-11-02 | Stop reason: SURG

## 2021-11-02 RX ORDER — CARBOPROST TROMETHAMINE 250 UG/ML
250 INJECTION, SOLUTION INTRAMUSCULAR
Status: DISCONTINUED | OUTPATIENT
Start: 2021-11-02 | End: 2021-11-02

## 2021-11-02 RX ORDER — SODIUM CHLORIDE 0.9 % (FLUSH) 0.9 %
10 SYRINGE (ML) INJECTION AS NEEDED
Status: DISCONTINUED | OUTPATIENT
Start: 2021-11-02 | End: 2021-11-02

## 2021-11-02 RX ORDER — OXYTOCIN-SODIUM CHLORIDE 0.9% IV SOLN 30 UNIT/500ML 30-0.9/5 UT/ML-%
125 SOLUTION INTRAVENOUS CONTINUOUS PRN
Status: COMPLETED | OUTPATIENT
Start: 2021-11-02 | End: 2021-11-02

## 2021-11-02 RX ORDER — ONDANSETRON 2 MG/ML
4 INJECTION INTRAMUSCULAR; INTRAVENOUS EVERY 6 HOURS PRN
Status: DISCONTINUED | OUTPATIENT
Start: 2021-11-02 | End: 2021-11-02

## 2021-11-02 RX ORDER — ONDANSETRON 4 MG/1
4 TABLET, FILM COATED ORAL EVERY 6 HOURS PRN
Status: DISCONTINUED | OUTPATIENT
Start: 2021-11-02 | End: 2021-11-02

## 2021-11-02 RX ORDER — DOCUSATE SODIUM 100 MG/1
100 CAPSULE, LIQUID FILLED ORAL 2 TIMES DAILY
Status: DISCONTINUED | OUTPATIENT
Start: 2021-11-02 | End: 2021-11-04 | Stop reason: HOSPADM

## 2021-11-02 RX ORDER — LIDOCAINE HYDROCHLORIDE 10 MG/ML
5 INJECTION, SOLUTION EPIDURAL; INFILTRATION; INTRACAUDAL; PERINEURAL AS NEEDED
Status: DISCONTINUED | OUTPATIENT
Start: 2021-11-02 | End: 2021-11-02

## 2021-11-02 RX ORDER — SODIUM CHLORIDE 0.9 % (FLUSH) 0.9 %
10 SYRINGE (ML) INJECTION EVERY 12 HOURS SCHEDULED
Status: DISCONTINUED | OUTPATIENT
Start: 2021-11-02 | End: 2021-11-02

## 2021-11-02 RX ORDER — METHYLERGONOVINE MALEATE 0.2 MG/ML
200 INJECTION INTRAVENOUS ONCE AS NEEDED
Status: DISCONTINUED | OUTPATIENT
Start: 2021-11-02 | End: 2021-11-02

## 2021-11-02 RX ORDER — IBUPROFEN 600 MG/1
600 TABLET ORAL EVERY 8 HOURS PRN
Status: DISCONTINUED | OUTPATIENT
Start: 2021-11-02 | End: 2021-11-04 | Stop reason: HOSPADM

## 2021-11-02 RX ORDER — MAGNESIUM CARB/ALUMINUM HYDROX 105-160MG
30 TABLET,CHEWABLE ORAL ONCE
Status: DISCONTINUED | OUTPATIENT
Start: 2021-11-02 | End: 2021-11-02

## 2021-11-02 RX ORDER — BISACODYL 10 MG
10 SUPPOSITORY, RECTAL RECTAL DAILY PRN
Status: DISCONTINUED | OUTPATIENT
Start: 2021-11-03 | End: 2021-11-04 | Stop reason: HOSPADM

## 2021-11-02 RX ORDER — SODIUM CHLORIDE, SODIUM LACTATE, POTASSIUM CHLORIDE, CALCIUM CHLORIDE 600; 310; 30; 20 MG/100ML; MG/100ML; MG/100ML; MG/100ML
125 INJECTION, SOLUTION INTRAVENOUS CONTINUOUS
Status: DISCONTINUED | OUTPATIENT
Start: 2021-11-02 | End: 2021-11-02

## 2021-11-02 RX ADMIN — OXYTOCIN 125 ML/HR: 10 INJECTION INTRAVENOUS at 15:13

## 2021-11-02 RX ADMIN — SODIUM CHLORIDE, POTASSIUM CHLORIDE, SODIUM LACTATE AND CALCIUM CHLORIDE 125 ML/HR: 600; 310; 30; 20 INJECTION, SOLUTION INTRAVENOUS at 10:30

## 2021-11-02 RX ADMIN — Medication 1 APPLICATION: at 23:20

## 2021-11-02 RX ADMIN — ROPIVACAINE HYDROCHLORIDE 4 MG: 2 INJECTION, SOLUTION EPIDURAL; INFILTRATION at 09:54

## 2021-11-02 RX ADMIN — OXYTOCIN 2 MILLI-UNITS/MIN: 10 INJECTION INTRAVENOUS at 07:45

## 2021-11-02 RX ADMIN — Medication 10 ML/HR: at 09:56

## 2021-11-02 RX ADMIN — FENTANYL CITRATE 100 MCG: 0.05 INJECTION, SOLUTION INTRAMUSCULAR; INTRAVENOUS at 12:03

## 2021-11-02 RX ADMIN — ONDANSETRON 4 MG: 2 INJECTION INTRAMUSCULAR; INTRAVENOUS at 09:22

## 2021-11-02 RX ADMIN — ROPIVACAINE HYDROCHLORIDE 5 MG: 2 INJECTION, SOLUTION EPIDURAL; INFILTRATION at 09:50

## 2021-11-02 RX ADMIN — OXYTOCIN 250 ML/HR: 10 INJECTION INTRAVENOUS at 14:07

## 2021-11-02 RX ADMIN — ROPIVACAINE HYDROCHLORIDE 4 ML: 2 INJECTION, SOLUTION EPIDURAL; INFILTRATION at 12:03

## 2021-11-02 RX ADMIN — DOCUSATE SODIUM 100 MG: 100 CAPSULE, LIQUID FILLED ORAL at 23:20

## 2021-11-02 RX ADMIN — IBUPROFEN 600 MG: 600 TABLET ORAL at 23:20

## 2021-11-02 RX ADMIN — LEVOTHYROXINE SODIUM 50 MCG: 0.05 TABLET ORAL at 18:29

## 2021-11-02 RX ADMIN — FAMOTIDINE 20 MG: 10 INJECTION, SOLUTION INTRAVENOUS at 09:44

## 2021-11-02 RX ADMIN — SODIUM CHLORIDE, POTASSIUM CHLORIDE, SODIUM LACTATE AND CALCIUM CHLORIDE 125 ML/HR: 600; 310; 30; 20 INJECTION, SOLUTION INTRAVENOUS at 07:45

## 2021-11-02 RX ADMIN — OXYTOCIN 999 ML/HR: 10 INJECTION INTRAVENOUS at 13:51

## 2021-11-02 NOTE — PLAN OF CARE
Problem: Adult Inpatient Plan of Care  Goal: Plan of Care Review  Outcome: Ongoing, Progressing  Flowsheets (Taken 11/2/2021 0635)  Progress: no change  Plan of Care Reviewed With:   patient   spouse  Outcome Summary: pt here comfortable with ctx at this time. unsure if she wants an epidural or not  Goal: Patient-Specific Goal (Individualized)  Outcome: Ongoing, Progressing  Goal: Absence of Hospital-Acquired Illness or Injury  Outcome: Ongoing, Progressing  Intervention: Prevent Skin Injury  Recent Flowsheet Documentation  Taken 11/2/2021 0517 by Elvira Chau RN  Body Position: sitting up in bed  Goal: Optimal Comfort and Wellbeing  Outcome: Ongoing, Progressing  Intervention: Provide Person-Centered Care  Recent Flowsheet Documentation  Taken 11/2/2021 0517 by Elvira Chau RN  Trust Relationship/Rapport: care explained  Goal: Readiness for Transition of Care  Outcome: Ongoing, Progressing  Intervention: Mutually Develop Transition Plan  Recent Flowsheet Documentation  Taken 11/2/2021 0530 by Elvira Chau, RN  Equipment Currently Used at Home: none  Taken 11/2/2021 0527 by Elvira Chau RN  Transportation Anticipated: family or friend will provide  Patient/Family Anticipated Services at Transition: none  Patient/Family Anticipates Transition to: home     Problem: Bleeding (Labor)  Goal: Hemostasis  Outcome: Ongoing, Progressing     Problem: Change in Fetal Wellbeing (Labor)  Goal: Stable Fetal Wellbeing  Outcome: Ongoing, Progressing  Intervention: Promote and Monitor Fetal Wellbeing  Recent Flowsheet Documentation  Taken 11/2/2021 0517 by Elvira Chau, RN  Body Position: sitting up in bed     Problem: Delayed Labor Progression (Labor)  Goal: Effective Progression to Delivery  Outcome: Ongoing, Progressing     Problem: Infection (Labor)  Goal: Absence of Infection Signs and Symptoms  Outcome: Ongoing, Progressing     Problem: Labor Pain (Labor)  Goal: Acceptable Pain Control  Outcome: Ongoing, Progressing      Problem: Uterine Tachysystole (Labor)  Goal: Normal Uterine Contraction Pattern  Outcome: Ongoing, Progressing   Goal Outcome Evaluation:  Plan of Care Reviewed With: patient, spouse        Progress: no change  Outcome Summary: pt here comfortable with ctx at this time. unsure if she wants an epidural or not

## 2021-11-02 NOTE — ANESTHESIA PREPROCEDURE EVALUATION
Anesthesia Evaluation     Patient summary reviewed and Nursing notes reviewed   no history of anesthetic complications:  NPO Solid Status: > 8 hours  NPO Liquid Status: > 2 hours           Airway   Mallampati: II  TM distance: >3 FB  Neck ROM: full  Dental - normal exam     Pulmonary - negative pulmonary ROS and normal exam   Cardiovascular - negative cardio ROS and normal exam  Exercise tolerance: good (4-7 METS)    Rhythm: regular        Neuro/Psych  (+) psychiatric history Anxiety,     GI/Hepatic/Renal/Endo    (+)   diabetes mellitus,     Musculoskeletal (-) negative ROS    Abdominal  - normal exam    Bowel sounds: normal.   Substance History - negative use     OB/GYN    (+) Pregnant,         Other                          Anesthesia Plan    ASA 2     epidural   (  40w5d  )    Anesthetic plan, all risks, benefits, and alternatives have been provided, discussed and informed consent has been obtained with: patient.    Plan discussed with CRNA and attending.

## 2021-11-02 NOTE — L&D DELIVERY NOTE
[facility]  Vaginal Delivery Note   Review the Delivery Report for details.       Delivery     Delivery: Vaginal, Spontaneous     YOB: 2019    Time of Birth:  Gestational Age 1:48 PM   40w5d     Anesthesia: Epidural     Delivering clinician: Josh Marcelo    Forceps?   No   Vacuum? No    Shoulder dystocia present: No        Delivery narrative: The patient is a 30-year-old  2 para 1-0-0-1 at 40-5/7 weeks who presented in labor early this morning.  She was scheduled for induction today also.  Fetal heart rate tracing was reassuring.  Patient was GBS negative.  Pitocin was used to augment labor this morning and I did an amniotomy which revealed clear fluid.  Patient requested an epidural.  She progressed through a normal labor curve.  When she was complete she was prepped and draped for delivery.  Patient pushed with excellent effort over 2 contractions and delivered the fetal head.  The nares and mouth were bulb suction.  There was no nuchal cord.  Shoulders and body delivered without difficulty.  Baby was placed on mom's abdomen and after 30 sec the cord was clamped and cut.  Cord blood was collected.  Placenta was delivered and noted to be complete.  Uterine exploration revealed no remaining tissue.  Second-degree vaginal and perineal laceration was repaired in standard fashion with 3-0 Vicryl.  Fundus was rechecked and noted to be firm.  Mom and baby are recovering well.    Infant    Findings: female  infant     Infant observations: Weight: No birth weight on file.   Length:   in  Observations/Comments:  Scale 1      Apgars: 9  @ 1 minute /    9  @ 5 minutes   Infant Name:  Vera     Placenta, Cord, and Fluid    Placenta delivered  Spontaneous  at   2021  1:51 PM     Cord: 3 vessels  present.   Nuchal Cord?  no   Cord blood obtained: Yes    Cord gases obtained:  No    Cord gas results: Venous:  No results found for: PHCVEN    Arterial:  No results found for: PHCART     Repair     Episiotomy: None     No    Lacerations: Yes  Laceration Information  Laceration Repaired?   Perineal: 2nd  Yes    Periurethral:       Labial:       Sulcus:       Vaginal:       Cervical:         Suture used for repair: 3-0 Vicryl   Estimated Blood Loss:               Quantitative Blood Loss:     Please see epic for has quantitative blood loss.             Complications  none    Disposition  Mother to Mother Baby/Postpartum  in stable condition currently.  Baby to remains with mom  in stable condition currently.      Josh Marcelo MD  11/02/21  14:15 EDT

## 2021-11-02 NOTE — H&P
[facility]  Obstetric History and Physical    Chief Complaint   Patient presents with   • Contractions     pt is a  @ 40.5 who is scheduled for an induction today but strated ctx around 0330 this am        Subjective     Patient is a 30 y.o. female  currently at 40w5d, who presents with contractions that started at about 3 AM.  Patient was scheduled for induction of labor today.  Patient reports the contractions have diminished recently.  She is considering natural childbirth.    Her fetal care is complicated by hypothyroidism-subclinical.  Her previous obstetric/gynecological history is noted for is non-contributory.    The following portions of the patients history were reviewed and updated as appropriate: past medical history, past surgical history, past family history, past social history and problem list .       Prenatal Information:  Prenatal Results     POC Urine Glucose/Protein     Test Value Reference Range Date Time    Urine Glucose  Negative mg/dL Negative, 1000 mg/dL (3+) 10/28/21 1452    Urine Protein  Negative mg/dL Negative 10/28/21 1452          Initial Prenatal Labs     Test Value Reference Range Date Time    Hemoglobin  12.6 g/dL 11.1 - 15.9 21 0955       13.1 g/dL 12.0 - 15.9 21 1444    Hematocrit  38.6 % 34.0 - 46.6 21 0955       38.5 % 34.0 - 46.6 21 1444    Platelets  285 10*3/mm3 140 - 450 21 0533       324 10*3/mm3 140 - 450 21 1110       333 x10E3/uL 150 - 450 21 0955       357 10*3/mm3 140 - 450 21 1444    Rubella IgG  1.99 index Immune >0.99 21 0955    Hepatitis B SAg  Negative  Negative 21 0955    Hepatitis C Ab  <0.1 s/co ratio 0.0 - 0.9 21 0955    RPR  Non Reactive  Non Reactive 21 0955    ABO  A   21 0533    Rh  Positive   21 0533    Antibody Screen  Negative  Negative 21 0955    HIV  Non Reactive  Non Reactive 21 0955    Urine Culture  Final report   21 0849       Final  report   02/11/21 0202    Gonorrhea  Negative  Negative 03/18/21 0936    Chlamydia  Negative  Negative 03/18/21 0936    TSH  2.110 uIU/mL 0.270 - 4.200 09/23/21 1618       2.270 uIU/mL 0.270 - 4.200 08/12/21 1110       2.330 uIU/mL 0.450 - 4.500 06/14/21 1529       2.110 uIU/mL 0.270 - 4.200 04/19/21 1617       4.330 uIU/mL 0.270 - 4.200 03/11/21 1045       4.170 uIU/mL 0.270 - 4.200 02/11/21 1444    HgB A1c               2nd and 3rd Trimester     Test Value Reference Range Date Time    Hemoglobin (repeated)  13.1 g/dL 12.0 - 15.9 11/02/21 0533       11.8 g/dL 12.0 - 15.9 08/12/21 1110    Hematocrit (repeated)  38.8 % 34.0 - 46.6 11/02/21 0533       35.8 % 34.0 - 46.6 08/12/21 1110    Platelets   285 10*3/mm3 140 - 450 11/02/21 0533       324 10*3/mm3 140 - 450 08/12/21 1110       333 x10E3/uL 150 - 450 03/18/21 0955       357 10*3/mm3 140 - 450 02/11/21 1444    GCT  88 mg/dL 65 - 139 08/12/21 1110    Antibody Screen (repeated)  Negative   11/02/21 0533    GTT Fasting  93 mg/dL 65 - 94 10/14/19 0833    GTT 1 Hr  210 mg/dL 65 - 179 10/14/19 0833    GTT 2 Hr  160 mg/dL 65 - 154 10/14/19 0833    GTT 3 Hr  103 mg/dL 65 - 139 10/14/19 0833    Group B Strep  Negative  Negative 10/07/21 1620          Drug Screening     Test Value Reference Range Date Time    Amphetamine Screen        Barbiturate Screen        Benzodiazepine Screen        Methadone Screen        Phencyclidine Screen        Opiates Screen        THC Screen        Cocaine Screen        Propoxyphene Screen        Buprenorphine Screen        Methamphetamine Screen        Oxycodone Screen        Tricyclic Antidepressants Screen              Other (Risk screening)     Test Value Reference Range Date Time    Varicella IgG ^ Pos H/O   03/18/21     Parvovirus IgG        CMV IgG        Cystic Fibrosis        Hemoglobin electrophoresis        NIPT        MSAFP-4        AFP (for NTD only)              Legend    ^: Historical                      External Prenatal  Results     Pregnancy Outside Results - Transcribed From Office Records - See Scanned Records For Details     Test Value Date Time    ABO  A  21 0533    Rh  Positive  21 0533    Antibody Screen  Negative  21 0533       Negative  21 0955    Varicella IgG ^ Pos H/O  21     Rubella  1.99 index 21 0955    Hgb  13.1 g/dL 21 0533       11.8 g/dL 21 1110       12.6 g/dL 21 0955       13.1 g/dL 21 1444    Hct  38.8 % 21 0533       35.8 % 21 1110       38.6 % 21 0955       38.5 % 21 1444    Glucose Fasting GTT  93 mg/dL 10/14/19 0833    Glucose Tolerance Test 1 hour  210 mg/dL 10/14/19 0833    Glucose Tolerance Test 3 hour  103 mg/dL 10/14/19 0833    Gonorrhea (discrete)  Negative  21 0936    Chlamydia (discrete)  Negative  21 0936    RPR  Non Reactive  21 0955    VDRL       Syphilis Antibody       HBsAg  Negative  21 0955    Herpes Simplex Virus PCR       Herpes Simplex VIrus Culture       HIV  Non Reactive  21 0955    Hep C RNA Quant PCR       Hep C Antibody  <0.1 s/co ratio 21 0955    AFP       Group B Strep  Negative  10/07/21 1620    GBS Susceptibility to Clindamycin       GBS Susceptibility to Erythromycin       Fetal Fibronectin       Genetic Testing, Maternal Blood             Drug Screening     Test Value Date Time    Urine Drug Screen       Amphetamine Screen       Barbiturate Screen       Benzodiazepine Screen       Methadone Screen       Phencyclidine Screen       Opiates Screen       THC Screen       Cocaine Screen       Propoxyphene Screen       Buprenorphine Screen       Methamphetamine Screen       Oxycodone Screen       Tricyclic Antidepressants Screen             Legend    ^: Historical                         Past OB History:     OB History    Para Term  AB Living   2 1 1 0 0 1   SAB IAB Ectopic Molar Multiple Live Births   0 0 0 0 0 1      # Outcome Date GA Lbr Rik/2nd  Weight Sex Delivery Anes PTL Lv   2 Current            1 Term 12/20/19 40w0d / 01:54 3415 g (7 lb 8.5 oz) F Vag-Spont EPI N SAMUEL      Birth Comments: scale 2      Name: TA CONRAD      Apgar1: 9  Apgar5: 9       Past Medical History: Past Medical History:   Diagnosis Date   • Anxiety     after mother's death - overdose    • Disease of thyroid gland       Past Surgical History Past Surgical History:   Procedure Laterality Date   • WISDOM TOOTH EXTRACTION        Family History: Family History   Problem Relation Age of Onset   • Prostate cancer Father    • Hypertension Father    • Drug abuse Mother    • Brain cancer Paternal Grandfather    • Heart attack Maternal Grandfather    • Stroke Maternal Grandfather       Social History:  reports that she has never smoked. She has never used smokeless tobacco.   reports no history of alcohol use.   reports no history of drug use.        General ROS: Patient reports good fetal movement.  No vaginal bleeding or loss of fluid.    Objective       Vital Signs Range for the last 24 hours  Temperature: Temp:  [98.1 °F (36.7 °C)] 98.1 °F (36.7 °C)   Temp Source: Temp src: Oral   BP: BP: (117-165)/(76-96) 126/81   Pulse: Heart Rate:  [68-95] 70   Respirations: Resp:  [18] 18   SPO2: SpO2:  [100 %] 100 %   O2 Amount (l/min):     O2 Devices     Weight: Weight:  [82.6 kg (182 lb)] 82.6 kg (182 lb)     Physical Examination: General appearance - alert, well appearing, and in no distress  Mental status - normal mood, behavior, speech, dress, motor activity, and thought processes  Eyes - sclera anicteric  Chest -breathing is nonlabored  Abdomen -gravid, size equal to dates    Presentation: vertex   Cervix: Exam by: Method: sterile exam per RN   Dilation: Cervical Dilation (cm): 3   Effacement: Cervical Effacement: 50%   Station:         Fetal Heart Rate Assessment   Method: Fetal HR Assessment Method: external   Beats/min: Fetal HR (beats/min): 130   Baseline: Fetal Heart Baseline Rate:  normal range   Variability: Fetal HR Variability: moderate (amplitude range 6 to 25 bpm)   Accels: Fetal HR Accelerations: greater than/equal to 15 bpm, lasting at least 15 seconds   Decels: Fetal HR Decelerations: absent   Tracing Category:       Uterine Assessment   Method: Method: external tocotransducer   Frequency (min): Contraction Frequency (Minutes): 2-4   Ctx Count in 10 min:     Duration:     Intensity: Contraction Intensity: moderate by palpation   Intensity by IUPC:     Resting Tone: Uterine Resting Tone: soft by palpation   Resting Tone by IUPC:     [place] Units:       Laboratory Results:   Results from last 7 days   Lab Units 21  0533   WBC 10*3/mm3 9.64   HEMOGLOBIN g/dL 13.1   HEMATOCRIT % 38.8   PLATELETS 10*3/mm3 285           Assessment/Plan       Subclinical hypothyroidism    Pregnant        Assessment:  1.  Intrauterine pregnancy at 40w5d gestation with reactive, reassuring fetal status.    2.  labor  without ROM-contractions have diminished so we will start Pitocin.  3.  Obstetrical history significant for is non-contributory.  4.  GBS status:   Strep Gp B NEGAR   Date Value Ref Range Status   10/07/2021 Negative Negative Final     Comment:     Centers for Disease Control and Prevention (CDC) and American Congress  of Obstetricians and Gynecologists (ACOG) guidelines for prevention of   group B streptococcal (GBS) disease specify co-collection of  a vaginal and rectal swab specimen to maximize sensitivity of GBS  detection. Per the CDC and ACOG, swabbing both the lower vagina and  rectum substantially increases the yield of detection compared with  sampling the vagina alone.  Penicillin G, ampicillin, or cefazolin are indicated for intrapartum  prophylaxis of  GBS colonization. Reflex susceptibility  testing should be performed prior to use of clindamycin only on GBS  isolates from penicillin-allergic women who are considered a high risk  for anaphylaxis. Treatment  with vancomycin without additional testing  is warranted if resistance to clindamycin is noted.         Plan:  1. fetal and uterine monitoring  continuously and labor augmentation  Pitocin  2. Plan of care has been reviewed with patient and her   3.  Risks, benefits of treatment plan have been discussed.  4.  All questions have been answered.        Josh Marcelo MD  11/2/2021  08:16 EDT

## 2021-11-02 NOTE — ANESTHESIA POSTPROCEDURE EVALUATION
"Patient: Deepti Davies    Procedure Summary     Date: 11/02/21 Room / Location:     Anesthesia Start: 0945 Anesthesia Stop: 1348    Procedure: LABOR ANALGESIA Diagnosis:     Scheduled Providers:  Provider: Patrick Pineda MD    Anesthesia Type: epidural ASA Status: 2          Anesthesia Type: epidural    Vitals  Vitals Value Taken Time   /63 11/02/21 1401   Temp 36.8 °C (98.2 °F) 11/02/21 1248   Pulse 102 11/02/21 1401   Resp 18 11/02/21 1248   SpO2     Vitals shown include unvalidated device data.        Post Anesthesia Care and Evaluation    Patient location during evaluation: bedside  Patient participation: complete - patient participated  Level of consciousness: awake and alert  Pain management: adequate  Airway patency: patent  Anesthetic complications: No anesthetic complications    Cardiovascular status: acceptable  Respiratory status: acceptable  Hydration status: acceptable    Comments: /75   Pulse 66   Temp 36.8 °C (98.2 °F) (Oral)   Resp 18   Ht 162.6 cm (64\")   Wt 82.6 kg (182 lb)   LMP 01/17/2021 Comment: 35 day cycles   SpO2 100%   Breastfeeding Yes   BMI 31.24 kg/m²       "

## 2021-11-02 NOTE — ANESTHESIA PROCEDURE NOTES
Labor Epidural      Patient reassessed immediately prior to procedure    Patient location during procedure: OB  Start Time: 11/2/2021 9:45 AM  Stop Time: 11/2/2021 9:56 AM  Indication:at surgeon's request  Performed By  Anesthesiologist: Patrick Pineda MD  Preanesthetic Checklist  Completed: patient identified, IV checked, site marked, risks and benefits discussed, surgical consent, monitors and equipment checked, pre-op evaluation and timeout performed  Prep:  Pt Position:sitting  Sterile Tech:cap, gloves and mask  Prep:povidone-iodine 7.5% surgical scrub  Monitoring:blood pressure monitoring, continuous pulse oximetry and EKG  Epidural Block Procedure:  Approach:midline  Guidance:landmark technique  Location:L4-L5  Needle Type:Tuohy  Needle Gauge:17 and 17 G  Loss of Resistance Medium: air  Loss of Resistance: 7cm  Cath Depth at skin:10 cm  Paresthesia: none  Aspiration:negative  Test Dose:negative  Number of Attempts: 1  Post Assessment:  Dressing:occlusive dressing applied and secured with tape  Pt Tolerance:patient tolerated the procedure well with no apparent complications  Complications:no

## 2021-11-02 NOTE — PLAN OF CARE
Goal Outcome Evaluation:           Progress: improving  Outcome Summary: Pt delivered vialbe female infant via . No complications. Mom and infant are stable. VSS. Plan for transfer to mother baby unit.

## 2021-11-02 NOTE — LACTATION NOTE
This note was copied from a baby's chart.  P2 term baby, 4 hrs old, has breast fed well x2 per Mom. Mom describes nursing difficulties that led to low weight gain at times with her first baby and she brought her Spectra pump to hospital if needed. Discussed STS, keeping baby awake while nursing, feeding cues, baby's output and ways to know baby is getting enough milk. Encouraged to call for any assistance.

## 2021-11-03 LAB
BASOPHILS # BLD AUTO: 0.04 10*3/MM3 (ref 0–0.2)
BASOPHILS NFR BLD AUTO: 0.4 % (ref 0–1.5)
DEPRECATED RDW RBC AUTO: 42.3 FL (ref 37–54)
EOSINOPHIL # BLD AUTO: 0.08 10*3/MM3 (ref 0–0.4)
EOSINOPHIL NFR BLD AUTO: 0.7 % (ref 0.3–6.2)
ERYTHROCYTE [DISTWIDTH] IN BLOOD BY AUTOMATED COUNT: 13 % (ref 12.3–15.4)
HCT VFR BLD AUTO: 35 % (ref 34–46.6)
HGB BLD-MCNC: 11.8 G/DL (ref 12–15.9)
IMM GRANULOCYTES # BLD AUTO: 0.04 10*3/MM3 (ref 0–0.05)
IMM GRANULOCYTES NFR BLD AUTO: 0.4 % (ref 0–0.5)
LYMPHOCYTES # BLD AUTO: 2.51 10*3/MM3 (ref 0.7–3.1)
LYMPHOCYTES NFR BLD AUTO: 23.3 % (ref 19.6–45.3)
MCH RBC QN AUTO: 29.8 PG (ref 26.6–33)
MCHC RBC AUTO-ENTMCNC: 33.7 G/DL (ref 31.5–35.7)
MCV RBC AUTO: 88.4 FL (ref 79–97)
MONOCYTES # BLD AUTO: 0.66 10*3/MM3 (ref 0.1–0.9)
MONOCYTES NFR BLD AUTO: 6.1 % (ref 5–12)
NEUTROPHILS NFR BLD AUTO: 69.1 % (ref 42.7–76)
NEUTROPHILS NFR BLD AUTO: 7.42 10*3/MM3 (ref 1.7–7)
NRBC BLD AUTO-RTO: 0 /100 WBC (ref 0–0.2)
PLATELET # BLD AUTO: 236 10*3/MM3 (ref 140–450)
PMV BLD AUTO: 10.9 FL (ref 6–12)
RBC # BLD AUTO: 3.96 10*6/MM3 (ref 3.77–5.28)
WBC # BLD AUTO: 10.75 10*3/MM3 (ref 3.4–10.8)

## 2021-11-03 PROCEDURE — 85025 COMPLETE CBC W/AUTO DIFF WBC: CPT | Performed by: OBSTETRICS & GYNECOLOGY

## 2021-11-03 RX ADMIN — OXYCODONE AND ACETAMINOPHEN 1 TABLET: 5; 325 TABLET ORAL at 14:44

## 2021-11-03 RX ADMIN — IBUPROFEN 600 MG: 600 TABLET ORAL at 09:13

## 2021-11-03 RX ADMIN — DOCUSATE SODIUM 100 MG: 100 CAPSULE, LIQUID FILLED ORAL at 19:41

## 2021-11-03 RX ADMIN — DOCUSATE SODIUM 100 MG: 100 CAPSULE, LIQUID FILLED ORAL at 09:13

## 2021-11-03 RX ADMIN — LEVOTHYROXINE SODIUM 50 MCG: 0.05 TABLET ORAL at 05:03

## 2021-11-03 RX ADMIN — NYSTATIN 1 APPLICATION: 100000 OINTMENT TOPICAL at 22:10

## 2021-11-03 RX ADMIN — OXYCODONE AND ACETAMINOPHEN 1 TABLET: 5; 325 TABLET ORAL at 04:15

## 2021-11-03 RX ADMIN — IBUPROFEN 600 MG: 600 TABLET ORAL at 17:25

## 2021-11-03 NOTE — LACTATION NOTE
Lactation Consult Note  Mom called for latch assistance. She is sore on left nipple and unable to tolerate latch, nipple red, intact. Mom latched baby to right breast and baby has nutritive suckle. Her breasts are asymmetrical which is most likely why she had low milk supply with her first baby. RN will order APNO for left nipple, Mom will take the night off latching on left side and pump instead. Encouraged pumping after most feeds and supplement baby with EBM. Parents are very aware of feeding cues after last baby.    Evaluation Completed: 11/3/2021 16:39 EDT  Patient Name: Deepti Davies  :  1990  MRN:  [id number]     REFERRAL  INFORMATION:                          Date of Referral: 21   Person Making Referral: patient  Maternal Reason for Referral: milk supply concern       DELIVERY HISTORY:        Skin to skin initiation date/time: 2021  1:51 PM   Skin to skin end date/time: 2021  2:51 PM        MATERNAL ASSESSMENT:     Breast Shape: asymmetric, round (21)  Breast Density: soft (21)  Areola: elastic (21)  Nipples: everted (21)                INFANT ASSESSMENT:  Information for the patient's :  Samson Pascual [[id number]]   No past medical history on file.     Feeding Readiness Cues: eager (21)      Feeding Tolerance/Success: alert for feeding (21)                              Breastfeeding: breastfeeding, right side only (21)   Infant Positioning: cross-cradle (21)         Effective Latch During Feeding: yes (21)   Suck/Swallow Coordination: present (21)   Signs of Milk Transfer: deep jaw excursions noted (21)       Latch: 2-->grasps breast, tongue down, lips flanged, rhythmic sucking (21)   Audible Swallowin-->a few with stimulation (21)   Type of Nipple: 2-->everted (after stimulation) (21)   Comfort (Breast/Nipple):  2-->soft/nontender (11/03/21 1600)   Hold (Positioning): 1-->minimal assist, teach one side, mother does other, staff holds (11/03/21 1600)   Latch Score: 8 (11/03/21 1600)     Infant-Driven Feeding Scales - Readiness: Alert once handled. Some rooting or takes pacifier. Adequate tone. (11/03/21 1600)   Infant-Driven Feeding Scales - Quality: Nipples with a strong coordinated SSB throughout feed. (11/03/21 1600)            MATERNAL INFANT FEEDING:     Maternal Emotional State: relaxed (11/03/21 1600)  Infant Positioning: cross-cradle (11/03/21 1600)   Signs of Milk Transfer: deep jaw excursions noted (11/03/21 1600)  Pain with Feeding: no (11/03/21 1600)           Milk Ejection Reflex: present (11/03/21 1600)           Latch Assistance: minimal assistance (11/03/21 1600)                               EQUIPMENT TYPE:  Breast Pump Type: double electric, personal (11/03/21 1600)                              BREAST PUMPING:  Breast Pumping Interventions: frequent pumping encouraged, post-feed pumping encouraged (11/03/21 1600)       LACTATION REFERRALS:  Lactation Referrals: outpatient lactation program (11/03/21 1600)

## 2021-11-03 NOTE — PROGRESS NOTES
"[facility]  Vaginal Delivery Progress Note    Subjective   Postpartum Day 1: Vaginal Delivery    The patient feels well.  Her pain is well controlled.  She is ambulating well.  Patient describes her bleeding as staining only.    Breastfeeding: infant latching.    ROS:  : neg for heavy bleeding  Musculoskeletal: neg for leg pain    Objective     Vital Signs Range for the last 24 hours  Temperature: Temp:  [97.1 °F (36.2 °C)-98.2 °F (36.8 °C)] 98.2 °F (36.8 °C)   Temp Source: Temp src: Oral   BP: BP: (102-124)/(54-93) 102/64   Pulse: Heart Rate:  [] 74   Respirations: Resp:  [16-20] 20   SPO2: SpO2:  [97 %-98 %] 98 %   O2 Amount (l/min):     O2 Devices Device (Oxygen Therapy): room air   Weight:       Admit Height:  Height: 162.6 cm (64\")      Physical Exam:  General:  no acute distress.  Abdomen: Fundus: appropriate, firm, non tender  Extremities: bilateral lower extremities are without cords, tenderness or edema      Lab results reviewed:   AM CBC pending  Rubella:     Rubella Antibodies, IgG   Date Value Ref Range Status   03/18/2021 1.99 Immune >0.99 index Final     Comment:                                     Non-immune       <0.90                                  Equivocal  0.90 - 0.99                                  Immune           >0.99       Rh Status:    RH type   Date Value Ref Range Status   11/02/2021 Positive  Final     Rh Factor   Date Value Ref Range Status   03/18/2021 Positive  Final     Comment:     Please note: Prior records for this patient's ABO / Rh type are not  available for additional verification.       Immunizations:   Immunization History   Administered Date(s) Administered   • COVID-19 (PFIZER) 08/27/2021, 09/17/2021   • FluLaval/Fluarix/Fluzone >6 09/23/2021   • Influenza, Unspecified 10/09/2019   • MMR 12/22/2019   • Tdap 10/09/2019, 08/12/2021   • flucelvax quad pfs =>4 YRS 10/09/2019       Assessment/Plan       Subclinical hypothyroidism    Pregnant    Vaginal " delivery      Deepti Davies is Day 1  post-partum  Vaginal, Spontaneous  Delivery Complications:Hand presentation .  Pt is doing well and denies any issues today.    Plan:  Continue current care.    Lab addendum at 1551:   Lab Results   Component Value Date    WBC 10.75 11/03/2021    HGB 11.8 (L) 11/03/2021    HCT 35.0 11/03/2021    MCV 88.4 11/03/2021     11/03/2021     Mackenzie Bowden MD  11/3/2021  09:06 EDT

## 2021-11-03 NOTE — LACTATION NOTE
This note was copied from a baby's chart.  Mom reports painful latches and will call with next feeding for assistance.

## 2021-11-03 NOTE — PLAN OF CARE
Goal Outcome Evaluation:           Progress: improving  Outcome Summary: 1PP. VSS; assessment noted labial swelling, applied ice packs and dermoplast is used. Managing pain with motrin and percocet. Voids spontaneously. Breast feeding infant

## 2021-11-04 VITALS
OXYGEN SATURATION: 98 % | HEIGHT: 64 IN | SYSTOLIC BLOOD PRESSURE: 111 MMHG | WEIGHT: 182 LBS | BODY MASS INDEX: 31.07 KG/M2 | DIASTOLIC BLOOD PRESSURE: 73 MMHG | TEMPERATURE: 97.9 F | HEART RATE: 72 BPM | RESPIRATION RATE: 17 BRPM

## 2021-11-04 PROCEDURE — 0503F POSTPARTUM CARE VISIT: CPT | Performed by: OBSTETRICS & GYNECOLOGY

## 2021-11-04 RX ORDER — IBUPROFEN 600 MG/1
600 TABLET ORAL EVERY 8 HOURS PRN
Qty: 30 TABLET | Refills: 0 | Status: SHIPPED | OUTPATIENT
Start: 2021-11-04 | End: 2021-11-15

## 2021-11-04 RX ADMIN — DOCUSATE SODIUM 100 MG: 100 CAPSULE, LIQUID FILLED ORAL at 10:12

## 2021-11-04 RX ADMIN — OXYCODONE AND ACETAMINOPHEN 1 TABLET: 5; 325 TABLET ORAL at 04:49

## 2021-11-04 RX ADMIN — IBUPROFEN 600 MG: 600 TABLET ORAL at 10:12

## 2021-11-04 RX ADMIN — LEVOTHYROXINE SODIUM 50 MCG: 0.05 TABLET ORAL at 05:09

## 2021-11-04 RX ADMIN — IBUPROFEN 600 MG: 600 TABLET ORAL at 01:15

## 2021-11-04 NOTE — LACTATION NOTE
Mom has baby latched well at this time. Encouraged insurance pumping after BF during the day and supplement all colostrum to baby. Educated on milk coming in, maternal diet and baby's expected output and weight gain. Mom has [facility], zoom and mommy and me info. Mom denies questions at this time. Encouraged to call LC as needed  Lactation Consult Note    Evaluation Completed: 2021 08:44 EDT  Patient Name: Deepti Davies  :  1990  MRN:  [id number]     REFERRAL  INFORMATION:                          Date of Referral: 21   Person Making Referral: patient  Maternal Reason for Referral: milk supply concern       DELIVERY HISTORY:        Skin to skin initiation date/time: 2021  1:51 PM   Skin to skin end date/time: 2021  2:51 PM        MATERNAL ASSESSMENT:                               INFANT ASSESSMENT:  Information for the patient's :  Pascual Davies [[id number]]   No past medical history on file.                                                                                                     MATERNAL INFANT FEEDING:                                                                       EQUIPMENT TYPE:                                 BREAST PUMPING:          LACTATION REFERRALS:

## 2021-11-04 NOTE — DISCHARGE SUMMARY
PPD 2 Discharge Summary/Progress      This  female, was admitted on 2021 and underwent a Vaginal, Spontaneous  on 2021 , resulting in the birth of the following:  Infant    Findings:    Infant observations:                            APGARS  One minute Five minutes Ten minutes Fifteen minutes Twenty minutes   Skin color: 1   1             Heart rate: 2   2             Grimace: 2   2              Muscle tone: 2   2              Breathin   2              Totals: 9   9                  LABS:   Lab Results (last 72 hours)     Procedure Component Value Units Date/Time    CBC & Differential [[id number]]  (Abnormal) Collected: 21    Specimen: Blood Updated: 21    Narrative:      The following orders were created for panel order CBC & Differential.  Procedure                               Abnormality         Status                     ---------                               -----------         ------                     CBC Auto Differential[[id number]]        Abnormal            Final result                 Please view results for these tests on the individual orders.    CBC Auto Differential [[id number]]  (Abnormal) Collected: 21    Specimen: Blood Updated: 21     WBC 10.75 10*3/mm3      RBC 3.96 10*6/mm3      Hemoglobin 11.8 g/dL      Hematocrit 35.0 %      MCV 88.4 fL      MCH 29.8 pg      MCHC 33.7 g/dL      RDW 13.0 %      RDW-SD 42.3 fl      MPV 10.9 fL      Platelets 236 10*3/mm3      Neutrophil % 69.1 %      Lymphocyte % 23.3 %      Monocyte % 6.1 %      Eosinophil % 0.7 %      Basophil % 0.4 %      Immature Grans % 0.4 %      Neutrophils, Absolute 7.42 10*3/mm3      Lymphocytes, Absolute 2.51 10*3/mm3      Monocytes, Absolute 0.66 10*3/mm3      Eosinophils, Absolute 0.08 10*3/mm3      Basophils, Absolute 0.04 10*3/mm3      Immature Grans, Absolute 0.04 10*3/mm3      nRBC 0.0 /100 WBC     Comprehensive Metabolic Panel [[id number]]  (Abnormal)  Collected: 11/02/21 0533    Specimen: Blood Updated: 11/02/21 0615     Glucose 103 mg/dL      BUN 10 mg/dL      Creatinine 0.58 mg/dL      Sodium 136 mmol/L      Potassium 3.8 mmol/L      Chloride 105 mmol/L      CO2 16.2 mmol/L      Calcium 8.6 mg/dL      Total Protein 6.9 g/dL      Albumin 3.60 g/dL      ALT (SGPT) 11 U/L      AST (SGOT) 16 U/L      Alkaline Phosphatase 142 U/L      Total Bilirubin <0.2 mg/dL      eGFR Non African Amer 124 mL/min/1.73      Globulin 3.3 gm/dL      A/G Ratio 1.1 g/dL      BUN/Creatinine Ratio 17.2     Anion Gap 14.8 mmol/L     Narrative:      GFR Normal >60  Chronic Kidney Disease <60  Kidney Failure <15      COVID PRE-OP / PRE-PROCEDURE SCREENING ORDER (NO ISOLATION) - Swab, Nasopharynx [[id number]]  (Normal) Collected: 11/02/21 0526    Specimen: Swab from Nasopharynx Updated: 11/02/21 0609    Narrative:      The following orders were created for panel order COVID PRE-OP / PRE-PROCEDURE SCREENING ORDER (NO ISOLATION) - Swab, Nasopharynx.  Procedure                               Abnormality         Status                     ---------                               -----------         ------                     COVID-19,[facility]...[[id number]]  Normal              Final result                 Please view results for these tests on the individual orders.    COVID-19,[facility] CEPHEID/SAUMYA NP SWAB IN TRANSPORT MEDIA 8-12 HR TAT - Swab, Nasopharynx [[id number]]  (Normal) Collected: 11/02/21 0526    Specimen: Swab from Nasopharynx Updated: 11/02/21 0609     COVID19 Not Detected    Narrative:      Fact sheet for providers: https://www.fda.gov/media/967041/download    Fact sheet for patients: https://www.fda.gov/media/326836/download    Test performed by PCR.    CBC (No Diff) [[id number]]  (Normal) Collected: 11/02/21 0533    Specimen: Blood Updated: 11/02/21 0555     WBC 9.64 10*3/mm3      RBC 4.48 10*6/mm3      Hemoglobin 13.1 g/dL      Hematocrit 38.8 %      MCV 86.6 fL       MCH 29.2 pg      MCHC 33.8 g/dL      RDW 13.1 %      RDW-SD 41.4 fl      MPV 11.2 fL      Platelets 285 10*3/mm3             ROS:  Pulm: neg for soa  GI: neg for heavy bleeding  Musculoskel: neg for leg pain       Discharge Medications      New Medications      Instructions Start Date   ibuprofen 600 MG tablet  Commonly known as: ADVIL,MOTRIN   600 mg, Oral, Every 8 Hours PRN         Continue These Medications      Instructions Start Date   levothyroxine 50 MCG tablet  Commonly known as: Synthroid   50 mcg, Oral, Daily      PRENATAL VITAMIN AND MINERAL PO   Oral         Stop These Medications    Influenza Vac Split Quad 0.5 ML suspension injection                 ASSESSMENT/DISCHARGE SUMMARY    Post delivery the patient did well. She was tolerating a regular diet, ambulating, voiding and passing flatus. Post delivery hemoglobin was   Lab Results   Component Value Date    HGB 11.8 (L) 2021   .      Discharge diagnosis:   Medical Problems             Hospital Problem List     Subclinical hypothyroidism    Vitamin D deficiency    Pregnant    Vaginal delivery                                                 Pregnant [Z34.90]                                     Vaginal Delivery at 40w5d, uncomplicated recovery    On day of discharge, uterus was firm, extremities were non tender with no erythema or masses. Lochia was normal.    Pt was given prescriptions for Motrin 800 mg PRN for pain.    Instructed to call the office to make an appointment in  6 weeks after your delivery.    Please call the office, or the OB/GYN on-call if after-hours, for any questions, concerns or any of the followin) Fever - a temperature greater than 100.4  2) Uncontrolled pain  3) Uncontrolled bleeding (soaking more than 1 pad in an hour)  4) Foul-smelling discharge from the vagina    Do not place anything in the vagina - this includes tampons, douches or having sex - until after your 6 week postpartum visit .    Ramos Montoya,  MD    11/4/2021  09:45 EDT

## 2021-11-15 RX ORDER — IBUPROFEN 600 MG/1
TABLET ORAL
Qty: 30 TABLET | Refills: 0 | Status: SHIPPED | OUTPATIENT
Start: 2021-11-15 | End: 2021-12-09

## 2021-12-08 PROBLEM — Z34.90 PREGNANT: Status: RESOLVED | Noted: 2021-11-02 | Resolved: 2021-12-08

## 2021-12-08 PROBLEM — Z34.90 PREGNANCY, UNSPECIFIED GESTATIONAL AGE: Status: RESOLVED | Noted: 2021-03-18 | Resolved: 2021-12-08

## 2021-12-09 ENCOUNTER — POSTPARTUM VISIT (OUTPATIENT)
Dept: OBSTETRICS AND GYNECOLOGY | Age: 31
End: 2021-12-09

## 2021-12-09 VITALS
SYSTOLIC BLOOD PRESSURE: 118 MMHG | BODY MASS INDEX: 26.98 KG/M2 | WEIGHT: 158 LBS | DIASTOLIC BLOOD PRESSURE: 72 MMHG | HEIGHT: 64 IN

## 2021-12-09 DIAGNOSIS — E03.8 SUBCLINICAL HYPOTHYROIDISM: ICD-10-CM

## 2021-12-09 DIAGNOSIS — Z12.4 SCREENING FOR MALIGNANT NEOPLASM OF THE CERVIX: ICD-10-CM

## 2021-12-09 PROBLEM — E55.9 VITAMIN D DEFICIENCY: Status: RESOLVED | Noted: 2021-03-08 | Resolved: 2021-12-09

## 2021-12-09 PROCEDURE — 0503F POSTPARTUM CARE VISIT: CPT | Performed by: OBSTETRICS & GYNECOLOGY

## 2021-12-09 NOTE — PROGRESS NOTES
"Subjective   Deepti Davies is a 31 y.o. female who presents for a postpartum visit. She is 6 weeks postpartum following a spontaneous vaginal delivery. I have fully reviewed the prenatal and intrapartum course. Postpartum course has been uneventful. Baby is feeding by breast. Bleeding has been normal in amount and decreasing. Bowel function is normal. Bladder function is normal. Patient not sexually active at this time. Contraception method is discussed. Postpartum depression screening: negative.    The following portions of the patient's history were reviewed and updated as appropriate: allergies, current medications,and problem list.    Review of Systems  Pertinent items are noted in HPI.    Objective   /72   Ht 162.6 cm (64\")   Wt 71.7 kg (158 lb)   LMP 01/17/2021 Comment: 35 day cycles   Breastfeeding Yes   BMI 27.12 kg/m²    General:  Alert and oriented, NAD    Breasts:         Heart:     Abdomen: Normal findings, nontender    Vulva: Normal, well-healed    Vagina: No lesions or abnormal discharge   Cervix:  Normal with no cervical motion tenderness   Corpus: Normal for post partum visit   Adnexa:  Non tender, non enlarged         Assessment/Plan     Normal postpartum exam. Pap smear done at today's visit.  Hypothyroidism-patient is on 50 mcg of Synthroid.  She was started on a low-dose of Synthroid prior to pregnancy.  We will check TSH today and see if the patient needs any dose adjustment.  I did recommend that she probably have it rechecked in about 6 months also.  1. Contraception: condoms   2. Slow return to normal activities reviewed. Continue prenatal vitamins.  3. Follow up in 12 months or sooner as needed.           "

## 2021-12-10 LAB — TSH SERPL DL<=0.005 MIU/L-ACNC: 0.94 UIU/ML (ref 0.45–4.5)

## 2021-12-14 LAB
CONV .: NORMAL
CYTOLOGIST CVX/VAG CYTO: NORMAL
CYTOLOGY CVX/VAG DOC CYTO: NORMAL
CYTOLOGY CVX/VAG DOC THIN PREP: NORMAL
DX ICD CODE: NORMAL
HIV 1 & 2 AB SER-IMP: NORMAL
Lab: NORMAL
OTHER STN SPEC: NORMAL
STAT OF ADQ CVX/VAG CYTO-IMP: NORMAL

## 2022-02-07 RX ORDER — LEVOTHYROXINE SODIUM 0.05 MG/1
50 TABLET ORAL DAILY
Qty: 30 TABLET | Refills: 0 | Status: SHIPPED | OUTPATIENT
Start: 2022-02-07 | End: 2022-08-15

## 2022-03-18 DIAGNOSIS — N39.3 STRESS INCONTINENCE IN FEMALE: Primary | ICD-10-CM

## 2022-08-15 RX ORDER — LEVOTHYROXINE SODIUM 0.05 MG/1
50 TABLET ORAL DAILY
Qty: 30 TABLET | Refills: 0 | Status: SHIPPED | OUTPATIENT
Start: 2022-08-15 | End: 2022-09-26

## 2022-09-26 RX ORDER — LEVOTHYROXINE SODIUM 0.05 MG/1
50 TABLET ORAL DAILY
Qty: 30 TABLET | Refills: 0 | Status: SHIPPED | OUTPATIENT
Start: 2022-09-26 | End: 2022-10-28

## 2022-10-28 RX ORDER — LEVOTHYROXINE SODIUM 0.05 MG/1
50 TABLET ORAL DAILY
Qty: 30 TABLET | Refills: 0 | Status: SHIPPED | OUTPATIENT
Start: 2022-10-28 | End: 2023-01-17

## 2022-11-17 ENCOUNTER — OFFICE VISIT (OUTPATIENT)
Dept: OBSTETRICS AND GYNECOLOGY | Age: 32
End: 2022-11-17

## 2022-11-17 VITALS
HEIGHT: 64 IN | BODY MASS INDEX: 28.17 KG/M2 | DIASTOLIC BLOOD PRESSURE: 72 MMHG | WEIGHT: 165 LBS | SYSTOLIC BLOOD PRESSURE: 116 MMHG

## 2022-11-17 DIAGNOSIS — N94.10 DYSPAREUNIA, FEMALE: ICD-10-CM

## 2022-11-17 DIAGNOSIS — R10.2 PELVIC PAIN: Primary | ICD-10-CM

## 2022-11-17 LAB
B-HCG UR QL: NEGATIVE
BILIRUB BLD-MCNC: NEGATIVE MG/DL
EXPIRATION DATE: NORMAL
GLUCOSE UR STRIP-MCNC: NEGATIVE MG/DL
INTERNAL NEGATIVE CONTROL: NORMAL
INTERNAL POSITIVE CONTROL: NORMAL
KETONES UR QL: NEGATIVE
LEUKOCYTE EST, POC: ABNORMAL
Lab: NORMAL
NITRITE UR-MCNC: NEGATIVE MG/ML
PH UR: 6 [PH] (ref 5–8)
PROT UR STRIP-MCNC: NEGATIVE MG/DL
RBC # UR STRIP: NEGATIVE /UL
SP GR UR: 1.01 (ref 1–1.03)
UROBILINOGEN UR QL: NORMAL

## 2022-11-17 PROCEDURE — 76830 TRANSVAGINAL US NON-OB: CPT | Performed by: OBSTETRICS & GYNECOLOGY

## 2022-11-17 PROCEDURE — 99213 OFFICE O/P EST LOW 20 MIN: CPT | Performed by: NURSE PRACTITIONER

## 2022-11-17 PROCEDURE — 81025 URINE PREGNANCY TEST: CPT | Performed by: NURSE PRACTITIONER

## 2022-11-17 NOTE — PROGRESS NOTES
"Subjective   Deepti Davies is a 31 y.o. female.     History of Present Illness     Deepti presents for follow up US to evaluate intermittent moderate left lower abdominal pain x 1-2 months.    US today is unremarkable- normal uterus- left ovarian follicle. Right ovary normal. Discussed findings with patient.    She also reports she has been experiencing some GI symptoms over the past several months- diarrhea, constipation, bloating, and heartburn. She has an upcoming appointment with her PCP next week.    Periods are normal. She is not using contraception. Has taken multiple negative home pregnancy tests. There is no concern for STI.     The following portions of the patient's history were reviewed and updated as appropriate: allergies, current medications, past family history, past medical history, past social history, past surgical history and problem list.    Review of Systems   Constitutional: Negative for chills, fatigue and fever.   Gastrointestinal: Positive for abdominal pain, constipation, diarrhea, nausea and indigestion. Negative for abdominal distention and vomiting.   Genitourinary: Positive for dyspareunia. Negative for decreased urine volume, difficulty urinating, dysuria, flank pain, frequency, genital sores, hematuria, menstrual problem, pelvic pressure, urgency, urinary incontinence, vaginal bleeding, vaginal discharge and vaginal pain.       Objective   Physical Exam  Constitutional:       Appearance: Normal appearance. She is not ill-appearing.   Skin:     General: Skin is warm and dry.   Neurological:      General: No focal deficit present.      Mental Status: She is alert and oriented to person, place, and time.   Psychiatric:         Mood and Affect: Mood normal.         Behavior: Behavior normal.       /72   Ht 162.6 cm (64\")   Wt 74.8 kg (165 lb)   Breastfeeding Yes   BMI 28.32 kg/m²       Assessment & Plan   Diagnoses and all orders for this visit:    1. Pelvic pain (Primary)  -   "   POC Urinalysis Dipstick  -     POC Pregnancy, Urine    2. Dyspareunia, female  -     US Non-ob Transvaginal        No explanation for her pain based on ultrasound findings today. Offered OCPs for ovarian suppression but patient declines. States she had problems in the past when taking birth control pills. She is reassured that her ovaries look good. She plans to follow up with primary care on Monday. Advised to return to us if symptoms persist or worsen and advised ER evaluation for acute, severe pain.    Rose Mckeon, APRN

## 2022-11-21 ENCOUNTER — OFFICE VISIT (OUTPATIENT)
Dept: OBSTETRICS AND GYNECOLOGY | Age: 32
End: 2022-11-21

## 2022-11-21 VITALS
HEIGHT: 64 IN | BODY MASS INDEX: 28.51 KG/M2 | DIASTOLIC BLOOD PRESSURE: 74 MMHG | WEIGHT: 167 LBS | SYSTOLIC BLOOD PRESSURE: 108 MMHG

## 2022-11-21 DIAGNOSIS — E03.8 SUBCLINICAL HYPOTHYROIDISM: ICD-10-CM

## 2022-11-21 DIAGNOSIS — R10.2 PELVIC PAIN: Primary | ICD-10-CM

## 2022-11-21 LAB — TSH SERPL DL<=0.005 MIU/L-ACNC: 3.12 UIU/ML (ref 0.27–4.2)

## 2022-11-21 PROCEDURE — 99213 OFFICE O/P EST LOW 20 MIN: CPT | Performed by: OBSTETRICS & GYNECOLOGY

## 2022-11-21 NOTE — PROGRESS NOTES
"  Chief complaint-pelvic pain     History of present illness- Patient is a 31 y.o.  who complains of  LLQ pain and pressure. Twinge down leg and to back, ramdom timing. No factors change. Pain after and during sex on left. BF no cycles. She is weaning. Using condoms. No plans for pregnancy. Normal pelvic u/s.  She had mood changes in the past on OCPs. Using lubricants. Dysmenorrhea in past.         /74   Ht 162.6 cm (64\")   Wt 75.8 kg (167 lb)   LMP 2022 (Approximate)   Breastfeeding Yes   BMI 28.67 kg/m²   Physical Exam  Constitutional:       General: She is not in acute distress.     Appearance: Normal appearance. She is not ill-appearing.   Neurological:      Mental Status: She is alert.   Psychiatric:         Mood and Affect: Mood normal.         Thought Content: Thought content normal.         Judgment: Judgment normal.     Pelvic exam-the uterus is anteverted and mobile.  No cervical motion tenderness.  Patient does have some tenderness in the posterior cul-de-sac and to the left.  No masses are felt.    Pelvic ultrasound recently normal     Diagnoses and all orders for this visit:    1. Pelvic pain (Primary)    2. Subclinical hypothyroidism  -     TSH    Discussed the possibility of endometriosis and discussed laparoscopy versus a trial of hormonal suppression.  Patient at this point would like to wean off breast-feeding and see if there is any improvement once her cycles return.  She will come back to see me in about 2 months to discuss.  Check thyroid today.  "

## 2023-01-17 ENCOUNTER — OFFICE VISIT (OUTPATIENT)
Dept: OBSTETRICS AND GYNECOLOGY | Age: 33
End: 2023-01-17
Payer: MEDICAID

## 2023-01-17 VITALS
BODY MASS INDEX: 27.66 KG/M2 | SYSTOLIC BLOOD PRESSURE: 110 MMHG | DIASTOLIC BLOOD PRESSURE: 64 MMHG | WEIGHT: 166 LBS | HEIGHT: 65 IN

## 2023-01-17 DIAGNOSIS — Z12.4 SCREENING FOR CERVICAL CANCER: ICD-10-CM

## 2023-01-17 DIAGNOSIS — Z01.419 WELL WOMAN EXAM WITH ROUTINE GYNECOLOGICAL EXAM: Primary | ICD-10-CM

## 2023-01-17 DIAGNOSIS — E03.8 SUBCLINICAL HYPOTHYROIDISM: ICD-10-CM

## 2023-01-17 LAB
T4 FREE SERPL-MCNC: 0.9 NG/DL (ref 0.93–1.7)
TSH SERPL DL<=0.005 MIU/L-ACNC: 5.63 UIU/ML (ref 0.27–4.2)

## 2023-01-17 PROCEDURE — 2014F MENTAL STATUS ASSESS: CPT | Performed by: NURSE PRACTITIONER

## 2023-01-17 PROCEDURE — 99395 PREV VISIT EST AGE 18-39: CPT | Performed by: NURSE PRACTITIONER

## 2023-01-17 PROCEDURE — 3008F BODY MASS INDEX DOCD: CPT | Performed by: NURSE PRACTITIONER

## 2023-01-17 NOTE — PROGRESS NOTES
Subjective     Chief Complaint   Patient presents with   • Gynecologic Exam     AE, last pap 2021 neg       History of Present Illness    Deepti Davies is a 32 y.o.  who presents for annual exam.    Doing well  Two daughters ages 3 and 1 , with her today, doing well   Just stopped nursing, waiting for her period to return  Using condoms, declines other contraception  She was started on levothyroxine for subclinical hypothyroidism at 25 mcg prior to pregnancy   Was increased to 50 mcg during pregnancy  Has been off of it for about a month and half  No symptoms   No other GYN concerns or complaints today     Obstetric History:  OB History        2    Para   2    Term   2            AB        Living   2       SAB        IAB        Ectopic        Molar        Multiple   0    Live Births   2               Menstrual History:     No LMP recorded (lmp unknown).         Current contraception: condoms  History of abnormal Pap smear: no  Received Gardasil immunization: yes   Perform regular self breast exam: yes - occl  Family history of uterine or ovarian cancer: no  Family History of colon cancer: no  Family history of breast cancer: no    Mammogram: not indicated.  Colonoscopy: not indicated.  DEXA: not indicated.    Exercise: moderately active  Calcium/Vitamin D: adequate intake    The following portions of the patient's history were reviewed and updated as appropriate: allergies, current medications, past family history, past medical history, past social history, past surgical history and problem list.    Review of Systems   Constitutional: Negative.    Respiratory: Negative.    Cardiovascular: Negative.    Gastrointestinal: Negative.    Genitourinary: Negative.    Skin: Negative.    Psychiatric/Behavioral: Negative.            Objective   Physical Exam  Constitutional:       General: She is awake.      Appearance: Normal appearance. She is well-developed.   HENT:      Head: Normocephalic and  atraumatic.      Nose: Nose normal.   Neck:      Thyroid: No thyroid mass, thyromegaly or thyroid tenderness.   Cardiovascular:      Rate and Rhythm: Normal rate and regular rhythm.      Pulses: Normal pulses.      Heart sounds: Normal heart sounds.   Pulmonary:      Effort: Pulmonary effort is normal.      Breath sounds: Normal breath sounds.   Chest:   Breasts:     Breasts are symmetrical.      Right: Normal. No swelling, bleeding, inverted nipple, mass, nipple discharge, skin change or tenderness.      Left: Normal. No swelling, bleeding, inverted nipple, mass, nipple discharge, skin change or tenderness.   Abdominal:      General: Abdomen is flat. Bowel sounds are normal.      Palpations: Abdomen is soft.      Tenderness: There is no abdominal tenderness.   Genitourinary:     General: Normal vulva.      Labia:         Right: No rash, tenderness, lesion or injury.         Left: No rash, tenderness, lesion or injury.       Urethra: No prolapse, urethral pain, urethral swelling or urethral lesion.      Vagina: Normal. No signs of injury. No vaginal discharge, erythema, tenderness, bleeding, lesions or prolapsed vaginal walls.      Cervix: No discharge, friability, lesion, erythema or cervical bleeding.      Uterus: Normal. Not enlarged, not tender and no uterine prolapse.       Adnexa: Right adnexa normal and left adnexa normal.        Right: No mass, tenderness or fullness.          Left: No mass, tenderness or fullness.        Rectum: Normal. No mass.   Musculoskeletal:      Cervical back: Normal range of motion and neck supple.   Lymphadenopathy:      Upper Body:      Right upper body: No supraclavicular adenopathy.      Left upper body: No supraclavicular adenopathy.   Skin:     General: Skin is warm and dry.   Neurological:      General: No focal deficit present.      Mental Status: She is alert and oriented to person, place, and time.   Psychiatric:         Mood and Affect: Mood normal.         Behavior:  "Behavior normal. Behavior is cooperative.         Thought Content: Thought content normal.         Judgment: Judgment normal.         /64   Ht 165.1 cm (65\")   Wt 75.3 kg (166 lb)   LMP  (LMP Unknown)   Breastfeeding No   BMI 27.62 kg/m²     Assessment & Plan   Diagnoses and all orders for this visit:    1. Well woman exam with routine gynecological exam (Primary)    2. Screening for cervical cancer  -     IGP, Apt HPV,rfx 16 / 18,45    3. Subclinical hypothyroidism  -     TSH Rfx On Abnormal To Free T4        All questions answered.  Breast self exam technique reviewed and patient encouraged to perform self-exam monthly.  Discussed healthy lifestyle modifications.  Recommended 30 minutes of aerobic exercise five times per week.  Discussed calcium needs to prevent osteoporosis.    -Pap today  -Recheck thyroid  -F/u 1 year                "

## 2023-01-18 RX ORDER — LEVOTHYROXINE SODIUM 0.05 MG/1
50 TABLET ORAL DAILY
Qty: 30 TABLET | Refills: 1 | Status: SHIPPED | OUTPATIENT
Start: 2023-01-18

## 2023-01-20 LAB
CYTOLOGIST CVX/VAG CYTO: NORMAL
CYTOLOGY CVX/VAG DOC CYTO: NORMAL
CYTOLOGY CVX/VAG DOC THIN PREP: NORMAL
DX ICD CODE: NORMAL
HIV 1 & 2 AB SER-IMP: NORMAL
HPV I/H RISK 4 DNA CVX QL PROBE+SIG AMP: NEGATIVE
OTHER STN SPEC: NORMAL
STAT OF ADQ CVX/VAG CYTO-IMP: NORMAL

## 2024-01-18 ENCOUNTER — OFFICE VISIT (OUTPATIENT)
Dept: OBSTETRICS AND GYNECOLOGY | Age: 34
End: 2024-01-18
Payer: MEDICAID

## 2024-01-18 VITALS
SYSTOLIC BLOOD PRESSURE: 118 MMHG | WEIGHT: 166 LBS | DIASTOLIC BLOOD PRESSURE: 72 MMHG | HEIGHT: 65 IN | BODY MASS INDEX: 27.66 KG/M2

## 2024-01-18 DIAGNOSIS — N92.6 IRREGULAR MENSES: ICD-10-CM

## 2024-01-18 DIAGNOSIS — E03.8 SUBCLINICAL HYPOTHYROIDISM: ICD-10-CM

## 2024-01-18 DIAGNOSIS — Z01.419 WELL WOMAN EXAM WITH ROUTINE GYNECOLOGICAL EXAM: Primary | ICD-10-CM

## 2024-01-18 DIAGNOSIS — Z12.4 SCREENING FOR CERVICAL CANCER: ICD-10-CM

## 2024-01-18 LAB — TSH SERPL DL<=0.005 MIU/L-ACNC: 2.78 UIU/ML (ref 0.27–4.2)

## 2024-01-18 NOTE — PROGRESS NOTES
Subjective     Chief Complaint   Patient presents with    Gynecologic Exam     AE, last pap 2023 neg/hpv neg  CC:  irregular menses       History of Present Illness    Deepti Davies is a 33 y.o.  who presents for annual exam.    Doing well  C/o irregular menses. Still having monthly periods but will be a week late sometimes. This past period was a 37 day cycle. Sometimes they are regular and these are very light. The ones that are late are very heavy with some clots. Also noting PMS around 2 weeks before menses.  Stopped breastfeeding a year ago  On levothyroxine for hypothyroidism, last check was in may. TSH was slightly elevated but normal T4. Will recheck today.  She does not want anything for birth control/cycle control and declines SSRI for PMS symptoms.  They want to have another child in near future.  Has two daughters ages 2,4.    Obstetric History:  OB History          2    Para   2    Term   2            AB        Living   2         SAB        IAB        Ectopic        Molar        Multiple   0    Live Births   2               Menstrual History:     Patient's last menstrual period was 2024.         Current contraception: condoms  History of abnormal Pap smear: no  Received Gardasil immunization: yes  Perform regular self breast exam: yes - occl  Family history of uterine or ovarian cancer: no  Family History of colon cancer: no  Family history of breast cancer: no    Mammogram: not indicated.  Colonoscopy: not indicated.  DEXA: not indicated.    Exercise: moderately active - strength training 3 x a week  Calcium/Vitamin D: adequate intake    The following portions of the patient's history were reviewed and updated as appropriate: allergies, current medications, past family history, past medical history, past social history, past surgical history, and problem list.    Review of Systems   Constitutional: Negative.    Respiratory: Negative.     Cardiovascular: Negative.     Gastrointestinal: Negative.    Genitourinary:  Positive for menstrual problem.   Skin: Negative.    Psychiatric/Behavioral: Negative.             Objective   Physical Exam  Constitutional:       General: She is awake.      Appearance: Normal appearance. She is well-developed.   HENT:      Head: Normocephalic and atraumatic.      Nose: Nose normal.   Neck:      Thyroid: No thyroid mass, thyromegaly or thyroid tenderness.   Cardiovascular:      Rate and Rhythm: Normal rate and regular rhythm.      Pulses: Normal pulses.      Heart sounds: Normal heart sounds.   Pulmonary:      Effort: Pulmonary effort is normal.      Breath sounds: Normal breath sounds.   Chest:   Breasts:     Breasts are symmetrical.      Right: Normal. No swelling, bleeding, inverted nipple, mass, nipple discharge, skin change or tenderness.      Left: Normal. No swelling, bleeding, inverted nipple, mass, nipple discharge, skin change or tenderness.   Abdominal:      General: Abdomen is flat. Bowel sounds are normal.      Palpations: Abdomen is soft.      Tenderness: There is no abdominal tenderness.   Genitourinary:     General: Normal vulva.      Labia:         Right: No rash, tenderness, lesion or injury.         Left: No rash, tenderness, lesion or injury.       Urethra: No prolapse, urethral pain, urethral swelling or urethral lesion.      Vagina: Normal. No signs of injury. No vaginal discharge, erythema, tenderness, bleeding, lesions or prolapsed vaginal walls.      Cervix: Normal. No discharge, friability, lesion, erythema or cervical bleeding.      Uterus: Normal. Not enlarged, not tender and no uterine prolapse.       Adnexa: Right adnexa normal and left adnexa normal.        Right: No mass, tenderness or fullness.          Left: No mass, tenderness or fullness.        Rectum: Normal. No mass.   Musculoskeletal:      Cervical back: Normal range of motion and neck supple.   Lymphadenopathy:      Upper Body:      Right upper body: No  "supraclavicular adenopathy.      Left upper body: No supraclavicular adenopathy.   Skin:     General: Skin is warm and dry.   Neurological:      General: No focal deficit present.      Mental Status: She is alert and oriented to person, place, and time.   Psychiatric:         Mood and Affect: Mood normal.         Behavior: Behavior normal. Behavior is cooperative.         Thought Content: Thought content normal.         Judgment: Judgment normal.         /72   Ht 165.1 cm (65\")   Wt 75.3 kg (166 lb)   LMP 01/11/2024   BMI 27.62 kg/m²     Assessment & Plan   Diagnoses and all orders for this visit:    1. Well woman exam with routine gynecological exam (Primary)    2. Screening for cervical cancer  -     IGP, Apt HPV,rfx 16 / 18,45    3. Irregular menses  -     TSH Rfx On Abnormal To Free T4    4. Subclinical hypothyroidism  -     TSH Rfx On Abnormal To Free T4        All questions answered.  Breast self exam technique reviewed and patient encouraged to perform self-exam monthly.  Discussed healthy lifestyle modifications.  Recommended 30 minutes of aerobic exercise five times per week.  Discussed calcium needs to prevent osteoporosis.    -Pap done  -Check TSH today  -Rec daily PNV once no longer preventing pregnancy   -F/u yearly, sooner prn                 "

## 2024-08-24 NOTE — PROGRESS NOTES
2019    Name:Deepti Davies   MR#:[id number]    Vaginal Delivery Progress Note    HD#3    Subjective   Postpartum Day 2: 29 y.o. yo Female  delivered at 40w0d  delivered a female  infant.     The patient feels well.  Her pain is controlled.    She is ambulating well.  Patient describes her bleeding as thin lochia.    Breastfeeding: infant latching without difficulty, cluster feeding overnight and they are very tired     Patient Active Problem List   Diagnosis   • Pregnancy   • Rubella non-immune status, antepartum   • Diet controlled gestational diabetes mellitus (GDM), antepartum       Objective   Vital Signs Range for the last 24 hours  Temp: Temp:  [97.8 °F (36.6 °C)-97.9 °F (36.6 °C)] 97.8 °F (36.6 °C) Temp src: Oral   BP: BP: (115-123)/(72-80) 115/72        Pulse: Heart Rate:  [72-77] 73  RR: Resp:  [16-18] 16  Weight: 81.4 kg (179 lb 6.4 oz)  BMI:  Body mass index is 29.85 kg/m².      Lab Results   Component Value Date    WBC 10.36 2019    HGB 12.3 2019    HCT 36.3 2019    MCV 87.5 2019     2019       Physical Exam  General:  no acute distresss.  Abdomen: abdomen is soft without significant tenderness, masses, organomegaly or guarding. Fundus: Firm with scant lochia  Extremities: no cyanosis, and no edema, no CT    Perineum:  Intact    Assessment/Plan   1.  PPD# 2  Rubella nonimmune and has received MMR already    Plan: DC home today      Suha Michel MD  2019 9:12 AM     cancer